# Patient Record
Sex: MALE | Race: WHITE | Employment: UNEMPLOYED | URBAN - METROPOLITAN AREA
[De-identification: names, ages, dates, MRNs, and addresses within clinical notes are randomized per-mention and may not be internally consistent; named-entity substitution may affect disease eponyms.]

---

## 2022-07-04 ENCOUNTER — HOSPITAL ENCOUNTER (OUTPATIENT)
Age: 57
Setting detail: OBSERVATION
Discharge: HOME | End: 2022-07-06
Attending: PSYCHIATRY & NEUROLOGY | Admitting: PSYCHIATRY & NEUROLOGY
Payer: COMMERCIAL

## 2022-07-04 ENCOUNTER — APPOINTMENT (OUTPATIENT)
Dept: GENERAL RADIOLOGY | Age: 57
End: 2022-07-04
Attending: PSYCHIATRY & NEUROLOGY
Payer: COMMERCIAL

## 2022-07-04 PROBLEM — R56.9 SEIZURE (HCC): Status: ACTIVE | Noted: 2022-07-04

## 2022-07-04 LAB
ALBUMIN SERPL-MCNC: 3.8 G/DL (ref 3.5–5.2)
ALBUMIN/GLOBULIN RATIO: 1.2 (ref 1–2.5)
ALP BLD-CCNC: 60 U/L (ref 40–129)
ALT SERPL-CCNC: 67 U/L (ref 5–41)
ANION GAP SERPL CALCULATED.3IONS-SCNC: 12 MMOL/L (ref 9–17)
AST SERPL-CCNC: 60 U/L
BILIRUB SERPL-MCNC: 0.89 MG/DL (ref 0.3–1.2)
BUN BLDV-MCNC: 11 MG/DL (ref 6–20)
CALCIUM SERPL-MCNC: 9 MG/DL (ref 8.6–10.4)
CHLORIDE BLD-SCNC: 104 MMOL/L (ref 98–107)
CO2: 23 MMOL/L (ref 20–31)
CREAT SERPL-MCNC: 0.71 MG/DL (ref 0.7–1.2)
GFR AFRICAN AMERICAN: >60 ML/MIN
GFR NON-AFRICAN AMERICAN: >60 ML/MIN
GFR SERPL CREATININE-BSD FRML MDRD: ABNORMAL ML/MIN/{1.73_M2}
GLUCOSE BLD-MCNC: 99 MG/DL (ref 70–99)
POTASSIUM SERPL-SCNC: 4 MMOL/L (ref 3.7–5.3)
SODIUM BLD-SCNC: 139 MMOL/L (ref 135–144)
TOTAL PROTEIN: 6.9 G/DL (ref 6.4–8.3)

## 2022-07-04 PROCEDURE — 2580000003 HC RX 258

## 2022-07-04 PROCEDURE — G0378 HOSPITAL OBSERVATION PER HR: HCPCS

## 2022-07-04 PROCEDURE — 96372 THER/PROPH/DIAG INJ SC/IM: CPT

## 2022-07-04 PROCEDURE — 6360000002 HC RX W HCPCS

## 2022-07-04 PROCEDURE — 80053 COMPREHEN METABOLIC PANEL: CPT

## 2022-07-04 PROCEDURE — 36415 COLL VENOUS BLD VENIPUNCTURE: CPT

## 2022-07-04 PROCEDURE — G0379 DIRECT REFER HOSPITAL OBSERV: HCPCS

## 2022-07-04 PROCEDURE — 2060000000 HC ICU INTERMEDIATE R&B

## 2022-07-04 PROCEDURE — 6370000000 HC RX 637 (ALT 250 FOR IP)

## 2022-07-04 PROCEDURE — 73030 X-RAY EXAM OF SHOULDER: CPT

## 2022-07-04 RX ORDER — ACETAMINOPHEN 650 MG/1
650 SUPPOSITORY RECTAL EVERY 6 HOURS PRN
Status: DISCONTINUED | OUTPATIENT
Start: 2022-07-04 | End: 2022-07-06 | Stop reason: HOSPADM

## 2022-07-04 RX ORDER — LISINOPRIL 20 MG/1
TABLET ORAL DAILY
COMMUNITY

## 2022-07-04 RX ORDER — LISINOPRIL 20 MG/1
20 TABLET ORAL DAILY
Status: DISCONTINUED | OUTPATIENT
Start: 2022-07-05 | End: 2022-07-06 | Stop reason: HOSPADM

## 2022-07-04 RX ORDER — ENOXAPARIN SODIUM 100 MG/ML
40 INJECTION SUBCUTANEOUS DAILY
Status: DISCONTINUED | OUTPATIENT
Start: 2022-07-04 | End: 2022-07-06 | Stop reason: HOSPADM

## 2022-07-04 RX ORDER — POTASSIUM CHLORIDE 7.45 MG/ML
10 INJECTION INTRAVENOUS PRN
Status: DISCONTINUED | OUTPATIENT
Start: 2022-07-04 | End: 2022-07-06 | Stop reason: HOSPADM

## 2022-07-04 RX ORDER — LORAZEPAM 2 MG/ML
1 INJECTION INTRAMUSCULAR EVERY 5 MIN PRN
Status: DISCONTINUED | OUTPATIENT
Start: 2022-07-04 | End: 2022-07-06 | Stop reason: HOSPADM

## 2022-07-04 RX ORDER — ACETAMINOPHEN 325 MG/1
650 TABLET ORAL EVERY 6 HOURS PRN
Status: DISCONTINUED | OUTPATIENT
Start: 2022-07-04 | End: 2022-07-06 | Stop reason: HOSPADM

## 2022-07-04 RX ORDER — SODIUM CHLORIDE 0.9 % (FLUSH) 0.9 %
5-40 SYRINGE (ML) INJECTION PRN
Status: DISCONTINUED | OUTPATIENT
Start: 2022-07-04 | End: 2022-07-06 | Stop reason: HOSPADM

## 2022-07-04 RX ORDER — POTASSIUM CHLORIDE 20 MEQ/1
40 TABLET, EXTENDED RELEASE ORAL PRN
Status: DISCONTINUED | OUTPATIENT
Start: 2022-07-04 | End: 2022-07-06 | Stop reason: HOSPADM

## 2022-07-04 RX ORDER — OXYCODONE HYDROCHLORIDE AND ACETAMINOPHEN 5; 325 MG/1; MG/1
1 TABLET ORAL EVERY 4 HOURS PRN
Status: DISCONTINUED | OUTPATIENT
Start: 2022-07-04 | End: 2022-07-05

## 2022-07-04 RX ORDER — ONDANSETRON 2 MG/ML
4 INJECTION INTRAMUSCULAR; INTRAVENOUS EVERY 6 HOURS PRN
Status: DISCONTINUED | OUTPATIENT
Start: 2022-07-04 | End: 2022-07-06 | Stop reason: HOSPADM

## 2022-07-04 RX ORDER — LEVETIRACETAM 500 MG/1
500 TABLET ORAL EVERY 12 HOURS
Status: DISCONTINUED | OUTPATIENT
Start: 2022-07-04 | End: 2022-07-06 | Stop reason: HOSPADM

## 2022-07-04 RX ORDER — SODIUM CHLORIDE 9 MG/ML
INJECTION, SOLUTION INTRAVENOUS PRN
Status: DISCONTINUED | OUTPATIENT
Start: 2022-07-04 | End: 2022-07-06 | Stop reason: HOSPADM

## 2022-07-04 RX ORDER — SODIUM CHLORIDE 0.9 % (FLUSH) 0.9 %
5-40 SYRINGE (ML) INJECTION EVERY 12 HOURS SCHEDULED
Status: DISCONTINUED | OUTPATIENT
Start: 2022-07-04 | End: 2022-07-06 | Stop reason: HOSPADM

## 2022-07-04 RX ORDER — ONDANSETRON 4 MG/1
4 TABLET, ORALLY DISINTEGRATING ORAL EVERY 8 HOURS PRN
Status: DISCONTINUED | OUTPATIENT
Start: 2022-07-04 | End: 2022-07-06 | Stop reason: HOSPADM

## 2022-07-04 RX ORDER — POLYETHYLENE GLYCOL 3350 17 G/17G
17 POWDER, FOR SOLUTION ORAL DAILY PRN
Status: DISCONTINUED | OUTPATIENT
Start: 2022-07-04 | End: 2022-07-06 | Stop reason: HOSPADM

## 2022-07-04 RX ADMIN — LEVETIRACETAM 500 MG: 500 TABLET, FILM COATED ORAL at 20:57

## 2022-07-04 RX ADMIN — ENOXAPARIN SODIUM 40 MG: 100 INJECTION SUBCUTANEOUS at 20:57

## 2022-07-04 RX ADMIN — SODIUM CHLORIDE, PRESERVATIVE FREE 10 ML: 5 INJECTION INTRAVENOUS at 20:57

## 2022-07-04 RX ADMIN — OXYCODONE HYDROCHLORIDE AND ACETAMINOPHEN 1 TABLET: 5; 325 TABLET ORAL at 21:22

## 2022-07-04 ASSESSMENT — PAIN DESCRIPTION - LOCATION
LOCATION: SHOULDER
LOCATION_3: BACK
LOCATION_2: ELBOW
LOCATION: BACK;SHOULDER;ELBOW

## 2022-07-04 ASSESSMENT — PAIN DESCRIPTION - ORIENTATION
ORIENTATION: RIGHT;LEFT
ORIENTATION: LEFT
ORIENTATION_2: RIGHT
ORIENTATION_3: LOWER

## 2022-07-04 ASSESSMENT — PAIN DESCRIPTION - ONSET: ONSET: ON-GOING

## 2022-07-04 ASSESSMENT — PAIN DESCRIPTION - FREQUENCY
FREQUENCY: CONTINUOUS
FREQUENCY: CONTINUOUS

## 2022-07-04 ASSESSMENT — PAIN - FUNCTIONAL ASSESSMENT
PAIN_FUNCTIONAL_ASSESSMENT: PREVENTS OR INTERFERES WITH MANY ACTIVE NOT PASSIVE ACTIVITIES
PAIN_FUNCTIONAL_ASSESSMENT_SITE2: PREVENTS OR INTERFERES SOME ACTIVE ACTIVITIES AND ADLS
PAIN_FUNCTIONAL_ASSESSMENT: PREVENTS OR INTERFERES SOME ACTIVE ACTIVITIES AND ADLS

## 2022-07-04 ASSESSMENT — PAIN DESCRIPTION - PAIN TYPE
TYPE: ACUTE PAIN
TYPE: ACUTE PAIN

## 2022-07-04 ASSESSMENT — PAIN SCALES - GENERAL
PAINLEVEL_OUTOF10: 5
PAINLEVEL_OUTOF10: 9
PAINLEVEL_OUTOF10: 8

## 2022-07-04 ASSESSMENT — PAIN DESCRIPTION - DESCRIPTORS
DESCRIPTORS: ACHING;STABBING
DESCRIPTORS_2: ACHING
DESCRIPTORS_3: ACHING;DISCOMFORT

## 2022-07-04 ASSESSMENT — PAIN DESCRIPTION - INTENSITY
RATING_3: 6
RATING_2: 7

## 2022-07-05 ENCOUNTER — APPOINTMENT (OUTPATIENT)
Dept: MRI IMAGING | Age: 57
End: 2022-07-05
Attending: PSYCHIATRY & NEUROLOGY
Payer: COMMERCIAL

## 2022-07-05 PROBLEM — G40.909 SEIZURE DISORDER (HCC): Status: ACTIVE | Noted: 2022-07-05

## 2022-07-05 PROBLEM — R56.9 SEIZURES (HCC): Status: ACTIVE | Noted: 2022-07-05

## 2022-07-05 PROBLEM — Z86.73 HISTORY OF CEREBRAL INFARCTION: Status: ACTIVE | Noted: 2022-07-05

## 2022-07-05 LAB
ABSOLUTE EOS #: 0.18 K/UL (ref 0–0.44)
ABSOLUTE IMMATURE GRANULOCYTE: <0.03 K/UL (ref 0–0.3)
ABSOLUTE LYMPH #: 2.08 K/UL (ref 1.1–3.7)
ABSOLUTE MONO #: 0.57 K/UL (ref 0.1–1.2)
ANION GAP SERPL CALCULATED.3IONS-SCNC: 10 MMOL/L (ref 9–17)
BASOPHILS # BLD: 1 % (ref 0–2)
BASOPHILS ABSOLUTE: 0.04 K/UL (ref 0–0.2)
BUN BLDV-MCNC: 13 MG/DL (ref 6–20)
CALCIUM SERPL-MCNC: 8.8 MG/DL (ref 8.6–10.4)
CHLORIDE BLD-SCNC: 102 MMOL/L (ref 98–107)
CO2: 25 MMOL/L (ref 20–31)
CREAT SERPL-MCNC: 0.86 MG/DL (ref 0.7–1.2)
EOSINOPHILS RELATIVE PERCENT: 4 % (ref 1–4)
GFR AFRICAN AMERICAN: >60 ML/MIN
GFR NON-AFRICAN AMERICAN: >60 ML/MIN
GFR SERPL CREATININE-BSD FRML MDRD: NORMAL ML/MIN/{1.73_M2}
GLUCOSE BLD-MCNC: 98 MG/DL (ref 70–99)
HCT VFR BLD CALC: 42.5 % (ref 40.7–50.3)
HEMOGLOBIN: 15.4 G/DL (ref 13–17)
IMMATURE GRANULOCYTES: 0 %
LYMPHOCYTES # BLD: 43 % (ref 24–43)
MCH RBC QN AUTO: 37.5 PG (ref 25.2–33.5)
MCHC RBC AUTO-ENTMCNC: 36.2 G/DL (ref 28.4–34.8)
MCV RBC AUTO: 103.4 FL (ref 82.6–102.9)
MONOCYTES # BLD: 12 % (ref 3–12)
NRBC AUTOMATED: 0.4 PER 100 WBC
PDW BLD-RTO: 11.9 % (ref 11.8–14.4)
PLATELET # BLD: ABNORMAL K/UL (ref 138–453)
PLATELET, FLUORESCENCE: 106 K/UL (ref 138–453)
PLATELET, IMMATURE FRACTION: 5.7 % (ref 1.1–10.3)
POTASSIUM SERPL-SCNC: 4 MMOL/L (ref 3.7–5.3)
RBC # BLD: 4.11 M/UL (ref 4.21–5.77)
RBC # BLD: ABNORMAL 10*6/UL
SEG NEUTROPHILS: 40 % (ref 36–65)
SEGMENTED NEUTROPHILS ABSOLUTE COUNT: 1.91 K/UL (ref 1.5–8.1)
SODIUM BLD-SCNC: 137 MMOL/L (ref 135–144)
WBC # BLD: 4.8 K/UL (ref 3.5–11.3)

## 2022-07-05 PROCEDURE — 70553 MRI BRAIN STEM W/O & W/DYE: CPT

## 2022-07-05 PROCEDURE — 36415 COLL VENOUS BLD VENIPUNCTURE: CPT

## 2022-07-05 PROCEDURE — 97116 GAIT TRAINING THERAPY: CPT

## 2022-07-05 PROCEDURE — A9579 GAD-BASE MR CONTRAST NOS,1ML: HCPCS

## 2022-07-05 PROCEDURE — 97161 PT EVAL LOW COMPLEX 20 MIN: CPT

## 2022-07-05 PROCEDURE — 6370000000 HC RX 637 (ALT 250 FOR IP)

## 2022-07-05 PROCEDURE — 93880 EXTRACRANIAL BILAT STUDY: CPT

## 2022-07-05 PROCEDURE — 85025 COMPLETE CBC W/AUTO DIFF WBC: CPT

## 2022-07-05 PROCEDURE — 6360000002 HC RX W HCPCS: Performed by: STUDENT IN AN ORGANIZED HEALTH CARE EDUCATION/TRAINING PROGRAM

## 2022-07-05 PROCEDURE — 6360000004 HC RX CONTRAST MEDICATION

## 2022-07-05 PROCEDURE — 6370000000 HC RX 637 (ALT 250 FOR IP): Performed by: STUDENT IN AN ORGANIZED HEALTH CARE EDUCATION/TRAINING PROGRAM

## 2022-07-05 PROCEDURE — G0378 HOSPITAL OBSERVATION PER HR: HCPCS

## 2022-07-05 PROCEDURE — 2580000003 HC RX 258

## 2022-07-05 PROCEDURE — 99223 1ST HOSP IP/OBS HIGH 75: CPT | Performed by: PSYCHIATRY & NEUROLOGY

## 2022-07-05 PROCEDURE — 80048 BASIC METABOLIC PNL TOTAL CA: CPT

## 2022-07-05 PROCEDURE — 85055 RETICULATED PLATELET ASSAY: CPT

## 2022-07-05 PROCEDURE — 96372 THER/PROPH/DIAG INJ SC/IM: CPT

## 2022-07-05 PROCEDURE — 6360000002 HC RX W HCPCS

## 2022-07-05 PROCEDURE — 95816 EEG AWAKE AND DROWSY: CPT | Performed by: PSYCHIATRY & NEUROLOGY

## 2022-07-05 PROCEDURE — 96374 THER/PROPH/DIAG INJ IV PUSH: CPT

## 2022-07-05 PROCEDURE — 95816 EEG AWAKE AND DROWSY: CPT

## 2022-07-05 RX ORDER — OXYCODONE HYDROCHLORIDE AND ACETAMINOPHEN 5; 325 MG/1; MG/1
1 TABLET ORAL EVERY 8 HOURS PRN
Status: DISCONTINUED | OUTPATIENT
Start: 2022-07-05 | End: 2022-07-05

## 2022-07-05 RX ORDER — IBUPROFEN 800 MG/1
800 TABLET ORAL EVERY 8 HOURS PRN
Status: DISCONTINUED | OUTPATIENT
Start: 2022-07-05 | End: 2022-07-06 | Stop reason: HOSPADM

## 2022-07-05 RX ORDER — KETOROLAC TROMETHAMINE 30 MG/ML
30 INJECTION, SOLUTION INTRAMUSCULAR; INTRAVENOUS ONCE
Status: COMPLETED | OUTPATIENT
Start: 2022-07-05 | End: 2022-07-05

## 2022-07-05 RX ORDER — ASPIRIN 81 MG/1
81 TABLET, CHEWABLE ORAL DAILY
Status: DISCONTINUED | OUTPATIENT
Start: 2022-07-05 | End: 2022-07-06 | Stop reason: HOSPADM

## 2022-07-05 RX ORDER — SODIUM CHLORIDE 0.9 % (FLUSH) 0.9 %
5-40 SYRINGE (ML) INJECTION 2 TIMES DAILY
Status: DISCONTINUED | OUTPATIENT
Start: 2022-07-05 | End: 2022-07-06 | Stop reason: HOSPADM

## 2022-07-05 RX ADMIN — IBUPROFEN 800 MG: 800 TABLET, FILM COATED ORAL at 14:02

## 2022-07-05 RX ADMIN — ENOXAPARIN SODIUM 40 MG: 100 INJECTION SUBCUTANEOUS at 08:41

## 2022-07-05 RX ADMIN — LEVETIRACETAM 500 MG: 500 TABLET, FILM COATED ORAL at 21:04

## 2022-07-05 RX ADMIN — IBUPROFEN 800 MG: 800 TABLET, FILM COATED ORAL at 21:04

## 2022-07-05 RX ADMIN — GADOTERIDOL 15 ML: 279.3 INJECTION, SOLUTION INTRAVENOUS at 15:01

## 2022-07-05 RX ADMIN — OXYCODONE HYDROCHLORIDE AND ACETAMINOPHEN 1 TABLET: 5; 325 TABLET ORAL at 05:48

## 2022-07-05 RX ADMIN — SODIUM CHLORIDE, PRESERVATIVE FREE 10 ML: 5 INJECTION INTRAVENOUS at 08:41

## 2022-07-05 RX ADMIN — KETOROLAC TROMETHAMINE 30 MG: 30 INJECTION, SOLUTION INTRAMUSCULAR; INTRAVENOUS at 18:25

## 2022-07-05 RX ADMIN — SODIUM CHLORIDE, PRESERVATIVE FREE 10 ML: 5 INJECTION INTRAVENOUS at 21:04

## 2022-07-05 RX ADMIN — OXYCODONE HYDROCHLORIDE AND ACETAMINOPHEN 1 TABLET: 5; 325 TABLET ORAL at 01:28

## 2022-07-05 RX ADMIN — LISINOPRIL 20 MG: 20 TABLET ORAL at 08:40

## 2022-07-05 RX ADMIN — OXYCODONE HYDROCHLORIDE AND ACETAMINOPHEN 1 TABLET: 5; 325 TABLET ORAL at 09:47

## 2022-07-05 RX ADMIN — LEVETIRACETAM 500 MG: 500 TABLET, FILM COATED ORAL at 08:40

## 2022-07-05 RX ADMIN — ASPIRIN 81 MG: 81 TABLET, CHEWABLE ORAL at 14:01

## 2022-07-05 ASSESSMENT — PAIN DESCRIPTION - LOCATION
LOCATION: SHOULDER
LOCATION: BACK;ELBOW;SHOULDER
LOCATION: SHOULDER
LOCATION_2: ELBOW
LOCATION: SHOULDER
LOCATION: BACK;SHOULDER
LOCATION_3: BACK
LOCATION: SHOULDER

## 2022-07-05 ASSESSMENT — PAIN DESCRIPTION - PAIN TYPE
TYPE: ACUTE PAIN
TYPE: ACUTE PAIN

## 2022-07-05 ASSESSMENT — PAIN SCALES - GENERAL
PAINLEVEL_OUTOF10: 3
PAINLEVEL_OUTOF10: 8
PAINLEVEL_OUTOF10: 7
PAINLEVEL_OUTOF10: 8
PAINLEVEL_OUTOF10: 7
PAINLEVEL_OUTOF10: 3
PAINLEVEL_OUTOF10: 8
PAINLEVEL_OUTOF10: 8
PAINLEVEL_OUTOF10: 5
PAINLEVEL_OUTOF10: 6

## 2022-07-05 ASSESSMENT — PAIN DESCRIPTION - DESCRIPTORS
DESCRIPTORS: ACHING
DESCRIPTORS_3: ACHING;DISCOMFORT
DESCRIPTORS: ACHING;DISCOMFORT
DESCRIPTORS: ACHING;SHARP
DESCRIPTORS: ACHING
DESCRIPTORS: ACHING;STABBING
DESCRIPTORS: ACHING;SHARP;SHOOTING

## 2022-07-05 ASSESSMENT — ENCOUNTER SYMPTOMS
CHEST TIGHTNESS: 0
SHORTNESS OF BREATH: 0
COLOR CHANGE: 0
NAUSEA: 0
PHOTOPHOBIA: 0
ABDOMINAL PAIN: 0
WHEEZING: 0
TROUBLE SWALLOWING: 0
VOICE CHANGE: 0

## 2022-07-05 ASSESSMENT — PAIN - FUNCTIONAL ASSESSMENT
PAIN_FUNCTIONAL_ASSESSMENT: PREVENTS OR INTERFERES WITH MANY ACTIVE NOT PASSIVE ACTIVITIES
PAIN_FUNCTIONAL_ASSESSMENT: PREVENTS OR INTERFERES SOME ACTIVE ACTIVITIES AND ADLS
PAIN_FUNCTIONAL_ASSESSMENT: ACTIVITIES ARE NOT PREVENTED
PAIN_FUNCTIONAL_ASSESSMENT: PREVENTS OR INTERFERES SOME ACTIVE ACTIVITIES AND ADLS
PAIN_FUNCTIONAL_ASSESSMENT: ACTIVITIES ARE NOT PREVENTED
PAIN_FUNCTIONAL_ASSESSMENT: PREVENTS OR INTERFERES SOME ACTIVE ACTIVITIES AND ADLS
PAIN_FUNCTIONAL_ASSESSMENT: ACTIVITIES ARE NOT PREVENTED

## 2022-07-05 ASSESSMENT — PAIN DESCRIPTION - ORIENTATION
ORIENTATION: LEFT
ORIENTATION: LEFT
ORIENTATION: RIGHT;LEFT;LOWER
ORIENTATION_2: RIGHT
ORIENTATION: LEFT
ORIENTATION_3: LOWER
ORIENTATION: RIGHT;LEFT;LOWER

## 2022-07-05 ASSESSMENT — PAIN DESCRIPTION - INTENSITY
RATING_3: 7
RATING_2: 5

## 2022-07-05 ASSESSMENT — PAIN DESCRIPTION - FREQUENCY
FREQUENCY: CONTINUOUS

## 2022-07-05 ASSESSMENT — PAIN DESCRIPTION - ONSET: ONSET: ON-GOING

## 2022-07-05 NOTE — PROCEDURES
[address]                  [facility]                          ELECTROENCEPHALOGRAM REPORT    PATIENT NAME: Jam Silver                 :        1965  MED REC NO:   [id number]                             ROOM:       4778  ACCOUNT NO:   [de-identified]                           ADMIT DATE: 2022  PROVIDER:     Margi Freed MD    DATE OF EE2022    ATTENDING OF RECORD:  Marig Freed MD    REASON FOR STUDY:  This is a 79-year-old gentleman with episode of  unresponsiveness and possible seizure. MEDICATIONS:  Include Advil, Keppra, and lisinopril. EEG FINDINGS:  This is an 18-channel EEG and one EKG channel recording  performed on a patient described to be awake and drowsy. The patient  shows normal waking rhythms. Background activity consists of  well-regulated 10 Hz activity in the 40-60 microvolt range, more  prominent in the posterior head area, showing good reactivity to eye  opening and closing. Over the anterior head regions, there is 15-20 Hz  activity in the 20-30 microvolt range. With drowsiness, there is  further intrusion of slower frequencies in a theta band. This record is  not lateralized or epileptiform. Hyperventilation is not performed. Photic stimulation shows no change of the record. IMPRESSION:  This EEG is within normal limits for an awake and drowsy  patient. No lateralized or epileptiform disturbance is seen.         Joaquin Maldonado MD    D: 2022 12:50:36       T: 2022 12:53:05     [id number]  Job#: [id number]     Doc#: [id number]    CC:

## 2022-07-05 NOTE — H&P
[facility] Neurology   [address] [place] [address]    HISTORY AND PHYSICAL EXAMINATION            Date:   7/4/2022  Patient name:  Sohan Archer  Date of admission:  7/4/2022  6:07 PM  MRN:   [id number]  Account:  [de-identified]  YOB: 1965  PCP:    . None (Inactive)  Room:   [address] [place] [address]  Code Status:    Full Code    Chief Complaint:     Seizure     History Obtained From:     Patient    History of Present Illness: The patient is a 64 y.o. Non- / non  male with past medical history of previous CVA and seizures (not on antiepileptics) who presents to [facility] for the management of likely seizure. Patient started behaving strangely around noon on 7/4/2022. Patient arrived at [facility] ED around 1 PM, and patient was alert and oriented on arrival.  At that time NIH assessment could not be done. Patient received Keppra at previous ED. When the patient was seen and examined at bedside, he stated he experienced left shoulder pain. He admitted to experiencing a fall and losing consciousness, however overall patient is a poor historian. Patient denies experiencing visual disturbances, headache, fevers, chills, chest pain, shortness of breath, abdominal pain. Past Medical History:     Past Medical History:   Diagnosis Date    Arthritis     Chronic back pain         Past Surgical History:     Past Surgical History:   Procedure Laterality Date    FINGER SURGERY          Medications Prior to Admission:     Prior to Admission medications    Medication Sig Start Date End Date Taking? Authorizing Provider   lisinopril (PRINIVIL;ZESTRIL) 20 MG tablet Take by mouth daily   Yes Historical Provider, MD   NONFORMULARY Take by mouth Daily Indications: Sofosbubir velpatavir 400mg/100mg    Yes Historical Provider, MD   oxycodone-acetaminophen (PERCOCET) 5-325 MG per tablet Take 1 tablet by mouth every 4 hours as needed.     Patient not taking: Reported on 7/4/2022    Historical Provider, MD   metoprolol (LOPRESSOR) 50 MG tablet Take 50 mg by mouth 2 times daily. Patient not taking: Reported on 7/4/2022    Historical Provider, MD   tramadol (ULTRAM) 50 MG tablet Take 1 tablet by mouth every 6 hours as needed for Pain. Patient not taking: Reported on 7/4/2022 1/19/12   Esperanza Castillo DO        Allergies:     Patient has no known allergies. Social History:     Tobacco:    reports that he has been smoking. He has never used smokeless tobacco.  Alcohol:      has no history on file for alcohol use. Drug Use:  has no history on file for drug use. Family History:     Family History   Problem Relation Age of Onset    Arthritis Mother     High Blood Pressure Mother        Review of Systems:     Review of Systems   Constitutional: Negative for chills, diaphoresis, fatigue and fever. HENT: Negative for trouble swallowing and voice change. Eyes: Negative for photophobia and visual disturbance. Respiratory: Negative for chest tightness, shortness of breath and wheezing. Cardiovascular: Negative for chest pain, palpitations and leg swelling. Gastrointestinal: Negative for abdominal pain and nausea. Genitourinary: Negative for dysuria and hematuria. Musculoskeletal: Negative for arthralgias and myalgias. Skin: Negative for color change, pallor, rash and wound. Neurological: Positive for weakness (Patient states he has weakness of his left upper extremity, however this is difficult to ascertain if it is due to trauma of the left shoulder. Patient states he has 8 out of 10 pain upon palpation of the left clavicle and shoulder joint). Negative for seizures (Patient states he likely had a seizure.), speech difficulty and headaches. Patient did state he lost consciousness. Psychiatric/Behavioral: Negative for agitation, behavioral problems and confusion.          Physical Exam:   BP (!) 157/91   Pulse 66   Temp 98.2 °F (36.8 °C) (Oral)   Resp 18   Ht 5' 3\" (1.6 m)   Wt 173 lb 1 oz (78.5 kg)   SpO2 96%   BMI 30.66 kg/m²   Temp (24hrs), Av.2 °F (36.8 °C), Min:98.2 °F (36.8 °C), Max:98.2 °F (36.8 °C)    No results for input(s): POCGLU in the last 72 hours.   No intake or output data in the 24 hours ending 22          GENERAL  Appears comfortable and in no distress   HEENT  NC/ AT   HEART  S1 and S2 heard; palpation of pulses: radial pulse    NECK  Supple and no bruits heard   MENTAL STATUS:  Alert, oriented, intact memory, no confusion, normal speech, normal language, no hallucination or delusion   CRANIAL NERVES: II     -      Visual fields intact to confrontation  III,IV,VI -  PERR, EOMs full, no ptosis  V     -     Normal facial sensation   VII    -     Normal facial symmetry  VIII   -     Intact hearing   IX,X -     Symmetrical palate  XI    -     Symmetrical shoulder shrug  XII   -     Midline tongue, no atrophy    MOTOR FUNCTION: RUE: Significant for good strength of grade 5/5 in proximal and distal muscle groups   LUE: Significant for good strength of grade 5/5 in proximal and distal muscle groups   RLE: Significant for good strength of grade 5/5 in proximal and distal muscle groups   LLE: Significant for good strength of grade 5/5 in proximal and distal muscle groups      Normal bulk, normal tone and no involuntary movements, no tremor   SENSORY FUNCTION:  Patient has diminished sensation to crude touch and pinprick on the right side of the body compared to the left including bilateral upper and lower extremities, normal vibration   CEREBELLAR FUNCTION:  Intact fine motor control over upper limbs and lower limbs   REFLEX FUNCTION:  Symmetric in upper and lower extremities, no Babinski sign   STATION and GAIT  deferred         Investigations:      Laboratory Testing:  Recent Results (from the past 24 hour(s))   Comprehensive Metabolic Panel w/ Reflex to MG    Collection Time: 22  8:23 PM   Result Value Ref Range Glucose 99 70 - 99 mg/dL    BUN 11 6 - 20 mg/dL    CREATININE 0.71 0.70 - 1.20 mg/dL    Calcium 9.0 8.6 - 10.4 mg/dL    Sodium 139 135 - 144 mmol/L    Potassium 4.0 3.7 - 5.3 mmol/L    Chloride 104 98 - 107 mmol/L    CO2 23 20 - 31 mmol/L    Anion Gap 12 9 - 17 mmol/L    Alkaline Phosphatase 60 40 - 129 U/L    ALT 67 (H) 5 - 41 U/L    AST 60 (H) <40 U/L    Total Bilirubin 0.89 0.3 - 1.2 mg/dL    Total Protein 6.9 6.4 - 8.3 g/dL    Albumin 3.8 3.5 - 5.2 g/dL    Albumin/Globulin Ratio 1.2 1.0 - 2.5    GFR Non-African American >60 >60 mL/min    GFR African American >60 >60 mL/min    GFR Comment               Assessment :      Primary Problem  Seizure [facility])    Active Hospital Problems    Diagnosis Date Noted    Seizure [facility]) [R56.9] 07/04/2022     Priority: Medium     1. Seizure with loss of consciousness    Patient is a 64 y.o. Non- / non  male presents today for likely seizure event. Patient states he lost consciousness, and had some prodromal symptoms of feeling unwell. Patient was brought in as a transfer from [facility] for management of seizures. Patient is back at baseline mentation, and is able to converse appropriately with logical thought process when he was seen and examined at bedside. Patient has diminished sensation to crude touch and pinprick on right side of the body compared to the left including both upper and lower extremities. He also had left upper extremity weakness, however this is confounded by possible trauma to the left shoulder. X-ray of the left shoulder will be obtained to further evaluate for acute pathology. Patient has intact strength (5 out of 5) on bilateral lower extremities, and has normal DTRs. Patient has grossly intact cranial nerves, II-XII. Patient will be started on Keppra 500 mg p.o. twice daily. There is no evidence of lateral tongue biting.       2.Possible traumatic injury to left shoulder in the setting of fall and complex seizure  Obtain x-ray of left shoulder to evaluate further. 3. Hypertension  Resume lisinopril for 20 mg p.o. daily. 4.DVT prophylaxis  Enoxaparin 40 mg subcutaneous daily    Plan:     Patient status Admit as observation in the  Progressive Unit/Step down    1. Start patient on Keppra 500 mg p.o. twice daily. Obtain MRI of brain with and without IV contrast      2.  obtain x-ray of left shoulder, analgesia with Percocet every 4 hours as needed    Consultations:   None      Follow-up further recommendations after discussing the case with attending  The plan was discussed with the patient, patient's family and the medical staff. Patient is admitted as inpatient status because of co-morbidities listed above, severity of signs and symptoms as outlined, requirement for current medical therapies and most importantly because of direct risk to patient if care not provided in a hospital setting.     Adi Lenz DO  7/4/2022  10:34 PM

## 2022-07-05 NOTE — PLAN OF CARE
Problem: Pain  Goal: Verbalizes/displays adequate comfort level or baseline comfort level  7/5/2022 1855 by Luis Lyles RN  Outcome: Progressing  7/5/2022 0510 by Elisabet Riggs RN  Outcome: Progressing

## 2022-07-05 NOTE — PROGRESS NOTES
[facility]up[facility] [address]  Occupational Therapy Not Seen Note    DATE: 2022    NAME: Heidi Crockett  MRN: [id number]   : 1965      Patient not seen this date for Occupational Therapy due to:      Testing: Pt off the floor at MRI at this time      Next Scheduled Treatment: Will check back 2022 as able     Electronically signed by Danette Miller OT on 2022 at 2:53 PM

## 2022-07-05 NOTE — PROGRESS NOTES
Physical Therapy  Facility/Department: [facility]  Physical Therapy Initial Assessment    Name: Fior Selby  : 1965  MRN: [id number]  Date of Service: 2022  The patient is a 64 y.o. Non- / non  male with past medical history of previous CVA and seizures (not on antiepileptics) who presents to [address] [place] [address] for the management of likely seizure. Patient started behaving strangely around noon on 2022. Patient arrived at [facility] ED around 1 PM, and patient was alert and oriented on arrival.  At that time NIH assessment could not be done. Patient received Keppra at previous ED. When the patient was seen and examined at bedside, he stated he experienced left shoulder pain. He admitted to experiencing a fall and losing consciousness, however overall patient is a poor historian. Patient denies experiencing visual disturbances, headache, fevers, chills, chest pain, shortness of breath, abdominal pain.      Discharge Recommendations:  Patient would benefit from continued therapy after discharge   PT Equipment Recommendations  Equipment Needed: No  Other: Recommend he uses either his cane or walker after discharge      Patient Diagnosis(es): There were no encounter diagnoses. Past Medical History:  has a past medical history of Arthritis and Chronic back pain. Past Surgical History:  has a past surgical history that includes Finger surgery. Assessment   Body Structures, Functions, Activity Limitations Requiring Skilled Therapeutic Intervention: Decreased functional mobility ; Decreased balance; Increased pain  Assessment: Patient is able to ambulate without device. In a community setting, or on uneven surfaces, his fall risk during ambulation without a device is increased. Recommend cane or walker use after discharge until he is back to his baseline balance ability. Patient verbalized agreement with this plan.  Patient will need further PT to regain Judgement: Good awareness of safety precautions  Problem Solving: Assistance required to generate solutions;Assistance required to identify errors made  Insights: Fully aware of deficits  Initiation: Does not require cues  Sequencing: Does not require cues     Objective   Heart Rate: 60  SpO2: 99 %  O2 Device: None (Room air)     Observation/Palpation  Observation: Previous surgery to right ankle, not fused in joint. Ambulates as if he is much older than 56 years. Strength RLE  Strength RLE: Exception  R Hip Flexion: 4+/5  R Knee Extension: 4+/5  R Ankle Dorsiflexion: 4+/5  R Ankle Plantar flexion: 4+/5  Strength LLE  Strength LLE: Exception  L Hip Flexion: 4+/5  L Knee Extension: 4+/5  L Ankle Dorsiflexion: 4+/5  L Ankle Plantar Flexion: 4+/5           Bed mobility  Rolling to Right: Supervision  Supine to Sit: Supervision  Sit to Supine: Supervision  Bed Mobility Comments: Held his breath during supine to sit then complained of feeling \"dizzy\" upon sitting up. Resolved while sitting momentarily. Transfers  Sit to Stand: Stand by assistance  Stand to sit: Stand by assistance  Ambulation  Surface: level tile  Device: No Device  Assistance: Stand by assistance  Quality of Gait: Short steps lengths with swing foot being placed just beyond the level of the stance foot. As gait continues, he does not \"loosen up\" and increase his step lengths.   Decreased eccentric control of dorsiflexors bilaterally  Gait Deviations: Slow Diana  Distance: 200'     Balance  Sitting - Static: Good  Sitting - Dynamic: Good  Standing - Static: Good;-  Standing - Dynamic: Good;-  Romberg/Narrow Base of Support  Eyes Open: 30 seconds  Sway: Minimal  Eyes Closed: 10 seconds  Sway: Moderate     AM-PAC Score  AM-PAC Inpatient Mobility Raw Score : 21 (07/05/22 0931)  AM-PAC Inpatient T-Scale Score : 50.25 (07/05/22 0931)  Mobility Inpatient CMS 0-100% Score: 28.97 (07/05/22 0931)  Mobility Inpatient CMS G-Code Modifier : CJ (07/05/22 6219)          Goals  Short Term Goals  Time Frame for Short term goals: 14 visits  Short term goal 1: Sit to/from stand with independence. Short term goal 2: Ambulate 200' with no device with independence. Short term goal 3: Patient will be independent with HEP. Short term goal 4: Negotiate up and down 3 steps with one railing with SBA. Education  Patient Education  Education Given To: Patient  Education Provided: Role of Therapy;Plan of Care; Fall Prevention Strategies;Precautions  Education Method: Demonstration;Verbal  Barriers to Learning: None  Education Outcome: Verbalized understanding      Therapy Time   Individual Concurrent Group Co-treatment   Time In 0915         Time Out 0940         Minutes 25         Timed Code Treatment Minutes: [address], PT

## 2022-07-06 VITALS
SYSTOLIC BLOOD PRESSURE: 130 MMHG | WEIGHT: 173.06 LBS | HEIGHT: 63 IN | OXYGEN SATURATION: 97 % | DIASTOLIC BLOOD PRESSURE: 76 MMHG | BODY MASS INDEX: 30.66 KG/M2 | HEART RATE: 86 BPM | RESPIRATION RATE: 18 BRPM | TEMPERATURE: 98 F

## 2022-07-06 LAB
ABSOLUTE EOS #: 0.16 K/UL (ref 0–0.44)
ABSOLUTE IMMATURE GRANULOCYTE: <0.03 K/UL (ref 0–0.3)
ABSOLUTE LYMPH #: 1.68 K/UL (ref 1.1–3.7)
ABSOLUTE MONO #: 0.46 K/UL (ref 0.1–1.2)
ANION GAP SERPL CALCULATED.3IONS-SCNC: 11 MMOL/L (ref 9–17)
BASOPHILS # BLD: 1 % (ref 0–2)
BASOPHILS ABSOLUTE: <0.03 K/UL (ref 0–0.2)
BUN BLDV-MCNC: 18 MG/DL (ref 6–20)
CALCIUM SERPL-MCNC: 9.3 MG/DL (ref 8.6–10.4)
CHLORIDE BLD-SCNC: 102 MMOL/L (ref 98–107)
CO2: 24 MMOL/L (ref 20–31)
CREAT SERPL-MCNC: 0.94 MG/DL (ref 0.7–1.2)
EOSINOPHILS RELATIVE PERCENT: 4 % (ref 1–4)
GFR AFRICAN AMERICAN: >60 ML/MIN
GFR NON-AFRICAN AMERICAN: >60 ML/MIN
GFR SERPL CREATININE-BSD FRML MDRD: NORMAL ML/MIN/{1.73_M2}
GLUCOSE BLD-MCNC: 88 MG/DL (ref 70–99)
HCT VFR BLD CALC: 45.9 % (ref 40.7–50.3)
HEMOGLOBIN: 16 G/DL (ref 13–17)
IMMATURE GRANULOCYTES: 0 %
LYMPHOCYTES # BLD: 38 % (ref 24–43)
MCH RBC QN AUTO: 35.9 PG (ref 25.2–33.5)
MCHC RBC AUTO-ENTMCNC: 34.9 G/DL (ref 28.4–34.8)
MCV RBC AUTO: 102.9 FL (ref 82.6–102.9)
MONOCYTES # BLD: 11 % (ref 3–12)
NRBC AUTOMATED: 0 PER 100 WBC
PDW BLD-RTO: 11.8 % (ref 11.8–14.4)
PLATELET # BLD: 85 K/UL (ref 138–453)
PMV BLD AUTO: 12.3 FL (ref 8.1–13.5)
POTASSIUM SERPL-SCNC: 4.2 MMOL/L (ref 3.7–5.3)
RBC # BLD: 4.46 M/UL (ref 4.21–5.77)
SEG NEUTROPHILS: 46 % (ref 36–65)
SEGMENTED NEUTROPHILS ABSOLUTE COUNT: 2.07 K/UL (ref 1.5–8.1)
SODIUM BLD-SCNC: 137 MMOL/L (ref 135–144)
WBC # BLD: 4.4 K/UL (ref 3.5–11.3)

## 2022-07-06 PROCEDURE — 2580000003 HC RX 258

## 2022-07-06 PROCEDURE — 6360000002 HC RX W HCPCS

## 2022-07-06 PROCEDURE — 6370000000 HC RX 637 (ALT 250 FOR IP): Performed by: STUDENT IN AN ORGANIZED HEALTH CARE EDUCATION/TRAINING PROGRAM

## 2022-07-06 PROCEDURE — 96372 THER/PROPH/DIAG INJ SC/IM: CPT

## 2022-07-06 PROCEDURE — 36415 COLL VENOUS BLD VENIPUNCTURE: CPT

## 2022-07-06 PROCEDURE — 80048 BASIC METABOLIC PNL TOTAL CA: CPT

## 2022-07-06 PROCEDURE — 6370000000 HC RX 637 (ALT 250 FOR IP)

## 2022-07-06 PROCEDURE — 99232 SBSQ HOSP IP/OBS MODERATE 35: CPT | Performed by: PSYCHIATRY & NEUROLOGY

## 2022-07-06 PROCEDURE — 85025 COMPLETE CBC W/AUTO DIFF WBC: CPT

## 2022-07-06 PROCEDURE — G0378 HOSPITAL OBSERVATION PER HR: HCPCS

## 2022-07-06 RX ORDER — ASPIRIN 81 MG/1
81 TABLET, CHEWABLE ORAL DAILY
Qty: 30 TABLET | Refills: 3 | Status: SHIPPED | OUTPATIENT
Start: 2022-07-07

## 2022-07-06 RX ORDER — LEVETIRACETAM 500 MG/1
500 TABLET ORAL EVERY 12 HOURS
Qty: 60 TABLET | Refills: 3 | Status: SHIPPED | OUTPATIENT
Start: 2022-07-06

## 2022-07-06 RX ORDER — CALCIUM CARBONATE 200(500)MG
500 TABLET,CHEWABLE ORAL 3 TIMES DAILY PRN
Status: DISCONTINUED | OUTPATIENT
Start: 2022-07-06 | End: 2022-07-06 | Stop reason: HOSPADM

## 2022-07-06 RX ADMIN — IBUPROFEN 800 MG: 800 TABLET, FILM COATED ORAL at 08:17

## 2022-07-06 RX ADMIN — ANTACID TABLETS 500 MG: 500 TABLET, CHEWABLE ORAL at 01:27

## 2022-07-06 RX ADMIN — ASPIRIN 81 MG: 81 TABLET, CHEWABLE ORAL at 08:18

## 2022-07-06 RX ADMIN — LEVETIRACETAM 500 MG: 500 TABLET, FILM COATED ORAL at 08:18

## 2022-07-06 RX ADMIN — ENOXAPARIN SODIUM 40 MG: 100 INJECTION SUBCUTANEOUS at 08:19

## 2022-07-06 RX ADMIN — SODIUM CHLORIDE, PRESERVATIVE FREE 10 ML: 5 INJECTION INTRAVENOUS at 08:19

## 2022-07-06 RX ADMIN — LISINOPRIL 20 MG: 20 TABLET ORAL at 08:18

## 2022-07-06 ASSESSMENT — ENCOUNTER SYMPTOMS
ABDOMINAL DISTENTION: 0
NAUSEA: 0
CHEST TIGHTNESS: 0
CONSTIPATION: 0
SHORTNESS OF BREATH: 0
VOMITING: 0
WHEEZING: 0
DIARRHEA: 0
COUGH: 0
APNEA: 0
ABDOMINAL PAIN: 0

## 2022-07-06 ASSESSMENT — PAIN DESCRIPTION - FREQUENCY
FREQUENCY: CONTINUOUS
FREQUENCY: CONTINUOUS

## 2022-07-06 ASSESSMENT — PAIN - FUNCTIONAL ASSESSMENT
PAIN_FUNCTIONAL_ASSESSMENT: ACTIVITIES ARE NOT PREVENTED

## 2022-07-06 ASSESSMENT — PAIN DESCRIPTION - ORIENTATION
ORIENTATION: LEFT

## 2022-07-06 ASSESSMENT — PAIN SCALES - GENERAL
PAINLEVEL_OUTOF10: 9
PAINLEVEL_OUTOF10: 5
PAINLEVEL_OUTOF10: 9

## 2022-07-06 ASSESSMENT — PAIN DESCRIPTION - LOCATION
LOCATION: SHOULDER

## 2022-07-06 ASSESSMENT — PAIN DESCRIPTION - DESCRIPTORS
DESCRIPTORS: ACHING
DESCRIPTORS: DULL;ACHING
DESCRIPTORS: DULL;ACHING

## 2022-07-06 NOTE — DISCHARGE SUMMARY
[facility]     Department of Neurology    INPATIENT DISCHARGE SUMMARY        Patient Identification:  Moni Howell is a 64 y.o. male. :  1965  MRN: [id number]     Acct: [de-identified]   Admit Date:  2022  Discharge date and time: 2022  Attending Provider: Martha Shea, *                                     Admission Diagnoses:   Seizure ([facility] [address] [R56.9]  Seizures ([facility] [address] [R56.9]    Discharge Diagnoses:   Principal Problem:    Seizure [facility])  Active Problems:    Seizure disorder ([facility] [address]    History of cerebral infarction  Resolved Problems:    * No resolved hospital problems. *       Consults:   none    Brief Inpatient course:    Patient is a 59-year-old male with prior history of CVA and seizures (not on any antiepileptics), presented to [facility] ED as a transfer from [facility] due to seizure-like event at home. Patient had similar events previously but had not been worked up or diagnosed with seizure disorder. Patient was loaded with Keppra prior to transfer, was continued on Keppra 500 mg twice daily. 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 Patient also underwent carotid ultrasound to rule out any significant stenosis, was unremarkable. Patient was counseled regarding his risk of seizures and the need for antiepileptic medication, was continued on Keppra 500 mg twice daily. Patient felt he was back to baseline without any significant deficits. Was discharged home with outpatient follow-up. Patient is stable from neurological standpoint to be discharged.     Procedures: None    Any Hospital Acquired Infections: none    Discharge Functional Status:  stable    Disposition: home    Patient Instructions:   Compliance with medication  Outpatient follow-up with neurology and PCPactivity as tolerated  No driving for at least 6 months. No heavy lifting for at least 6 months  No bathing or swimming unsupervised  Avoid locking doors, it prevents someone from helping you if needed  Avoid stairs unsupervised  Avoid cooking unsupervised      Discharge Medications:  Current Discharge Medication List      START taking these medications    Details   levETIRAcetam (KEPPRA) 500 MG tablet Take 1 tablet by mouth every 12 hours  Qty: 60 tablet, Refills: 3      aspirin 81 MG chewable tablet Take 1 tablet by mouth daily  Qty: 30 tablet, Refills: 3         CONTINUE these medications which have NOT CHANGED    Details   lisinopril (PRINIVIL;ZESTRIL) 20 MG tablet Take by mouth daily      NONFORMULARY Take by mouth Daily Indications: Sofosbubir velpatavir 400mg/100mg       oxycodone-acetaminophen (PERCOCET) 5-325 MG per tablet Take 1 tablet by mouth every 4 hours as needed. metoprolol (LOPRESSOR) 50 MG tablet Take 50 mg by mouth 2 times daily. tramadol (ULTRAM) 50 MG tablet Take 1 tablet by mouth every 6 hours as needed for Pain.   Qty: 40 tablet, Refills: 0    Associated Diagnoses: Headache(784.0)             Activity: activity as tolerated    Restrictions: Driving Yes and No, Swimming Yes, Operating heavy machinery Yes, Compromising heights Yes    Diet: regular diet    Follow-up:    [address],  [address]  [facility] [address] [place] [address], [address] [place] [address]  In 6 months  Hospital follow-up      Follow up labs: None    Follow up imaging: None    Note that over 30 minutes was spent in preparing discharge papers, discussing discharge with patient, medication review, etc.      Desirae Monaco MD,  Department of Neurology  [address] [place] [address]  7/6/2022, 4:54 PM

## 2022-07-06 NOTE — PROGRESS NOTES
CLINICAL PHARMACY NOTE: MEDS TO BEDS    Total # of Prescriptions Filled: 2   The following medications were delivered to the patient:  · keppra  · aspirin    Additional Documentation:

## 2022-07-06 NOTE — CARE COORDINATION
07/06/22 0840   Service Assessment   Patient Orientation Alert and Oriented   Cognition Alert   History Provided By Patient   Primary Caregiver Self  (girlfriend helps)   [address]or [address] is: Legal Next of Paul 69   PCP Verified by CM Yes  (Dr. Teo Floyd in [facility].)   Last Visit to PCP Within last 3 months   Prior Functional Level Independent in ADLs/IADLs   Current Functional Level Independent in ADLs/IADLs   Can patient return to prior living arrangement Yes   Ability to make needs known: Good   Family able to assist with home care needs: Yes   Social/Functional History   Lives With Significant other   Type of [address] Two level;Bed/Bath upstairs   Home Access Stairs to enter with rails   Entrance Stairs - Number of Steps 3   [address] Help From Friend(s)   ADL Assistance Independent   Homemaking Assistance Independent   Ambulation Assistance Independent   Transfer Assistance Independent   Active  No   Mode of Transportation Friends   Occupation Other(comment)  (doesn't work)   Discharge Planning   Living Arrangements Spouse/Significant Other   [address] Medications Yes   Meds-to-Beds: Does the patient want to have any new prescriptions delivered to bedside prior to discharge? Yes   Patient expects to be discharged to: House   One/Two Story Residence Two story   Condition of Participation: Discharge Planning   The Plan for Transition of Care is related to the following treatment goals: To get better and go home   Plan is to return home independent with girlfriend who will provide transport.

## 2022-07-06 NOTE — PROGRESS NOTES
[facility] Neurology   [address] [place] [address]    Progress Note             Date:   7/6/2022  Patient name:  Sruthi Ward  Date of admission:  7/4/2022  6:07 PM  MRN:   [id number]  Account:  [de-identified]  YOB: 1965  PCP:    . None (Inactive)  Room:   [address] [place] [address]  Code Status:    Full Code    Chief Complaint:     Seizure like activity    Interval hx: The patient was seen and examined at bedside. Is vitally stable, alert and oriented x 4. No acute events overnight. The patient stated that he feels well, denied any complaints. States he got out of the bed and took a shower this morning. Eager to be discharged home. Completed carotid Doppler this morning, awaiting report. Brief History of Present Illness: The patient is a 64 y.o. Non- / non  male with past medical history of previous CVA and seizures (not on antiepileptics) who presents to [address] [place] [address] for the management of likely seizure. Patient started behaving strangely around noon on 7/4/2022. Patient arrived at [facility] ED around 1 PM, and patient was alert and oriented on arrival.  At that time NIH assessment could not be done. Patient received Keppra at previous ED. When the patient was seen and examined at bedside, he stated he experienced left shoulder pain. He admitted to experiencing a fall and losing consciousness, however overall patient is a poor historian. Patient denies experiencing visual disturbances, headache, fevers, chills, chest pain, shortness of breath, abdominal pain. Past Medical History:     Past Medical History:   Diagnosis Date    Arthritis     Chronic back pain         Past Surgical History:     Past Surgical History:   Procedure Laterality Date    FINGER SURGERY          Medications Prior to Admission:     Prior to Admission medications    Medication Sig Start Date End Date Taking?  Authorizing Provider   lisinopril (PRINIVIL;ZESTRIL) 20 MG tablet Take by mouth daily   Yes Historical Provider, MD   NONFORMULARY Take by mouth Daily Indications: Sofosbubir velpatavir 400mg/100mg    Yes Historical Provider, MD   oxycodone-acetaminophen (PERCOCET) 5-325 MG per tablet Take 1 tablet by mouth every 4 hours as needed. Patient not taking: Reported on 7/4/2022    Historical Provider, MD   metoprolol (LOPRESSOR) 50 MG tablet Take 50 mg by mouth 2 times daily. Patient not taking: Reported on 7/4/2022    Historical Provider, MD   tramadol (ULTRAM) 50 MG tablet Take 1 tablet by mouth every 6 hours as needed for Pain. Patient not taking: Reported on 7/4/2022 1/19/12   Andres Feldman DO        Allergies:     Patient has no known allergies. Social History:     Tobacco:    reports that he has been smoking. He has never used smokeless tobacco.  Alcohol:      has no history on file for alcohol use. Drug Use:  has no history on file for drug use. Family History:     Family History   Problem Relation Age of Onset    Arthritis Mother     High Blood Pressure Mother        Review of Systems:     Review of Systems   Constitutional: Negative for activity change, appetite change, chills, diaphoresis and fever. Respiratory: Negative for apnea, cough, chest tightness, shortness of breath and wheezing. Cardiovascular: Negative for chest pain and palpitations. Gastrointestinal: Negative for abdominal distention, abdominal pain, constipation, diarrhea, nausea and vomiting. Genitourinary: Negative for difficulty urinating, dysuria and frequency. Musculoskeletal: Negative for arthralgias and myalgias. Neurological: Negative for dizziness, light-headedness and headaches. Psychiatric/Behavioral: Negative for agitation and confusion. All other systems reviewed and are negative.       Physical Exam:   /83   Pulse 51   Temp 98.1 °F (36.7 °C) (Oral)   Resp 16   Ht 5' 3\" (1.6 m)   Wt 173 lb 1 oz (78.5 kg)   SpO2 97% BMI 30.66 kg/m²   Temp (24hrs), Av.8 °F (36.6 °C), Min:97.6 °F (36.4 °C), Max:98.1 °F (36.7 °C)    No results for input(s): POCGLU in the last 72 hours.     Intake/Output Summary (Last 24 hours) at 2022 0915  Last data filed at 2022 1300  Gross per 24 hour   Intake 480 ml   Output --   Net 480 ml         Neurologic Exam     GENERAL  Appears comfortable and in no distress   HEENT  NC/ AT   HEART  S1 and S2 heard; palpation of pulses: radial pulse    NECK  Supple and no bruits heard   MENTAL STATUS:  Alert, oriented, intact memory, no confusion, normal speech, normal language, no hallucination or delusion   CRANIAL NERVES: II     -      Visual fields intact to confrontation  III,IV,VI -  PERR, EOMs full, no ptosis  V     -     Normal facial sensation   VII    -     Normal facial symmetry  VIII   -     Intact hearing   IX,X -     Symmetrical palate  XI    -     Symmetrical shoulder shrug  XII   -     Midline tongue, no atrophy    MOTOR FUNCTION: RUE: Significant for good strength of grade 5/5 in proximal and distal muscle groups   LUE: Significant for good strength of grade 5/5 in proximal and distal muscle groups   RLE: Significant for good strength of grade 5/5 in proximal and distal muscle groups   LLE: Significant for good strength of grade 5/5 in proximal and distal muscle groups      Normal bulk, normal tone and no involuntary movements, no tremor   SENSORY FUNCTION:  Normal touch, normal pinprick, normal vibration, normal proprioception   CEREBELLAR FUNCTION:  Intact fine motor control over upper limbs and lower limbs   REFLEX FUNCTION:  Symmetric in upper and lower extremities, no Babinski sign   STATION and GAIT  Normal gait and tandem station, normal tip toes and heel walking       Investigations:      Laboratory Testing:  Recent Results (from the past 24 hour(s))   Basic Metabolic Panel w/ Reflex to MG    Collection Time: 22  4:24 AM   Result Value Ref Range    Glucose 88 70 - 99 mg/dL BUN 18 6 - 20 mg/dL    CREATININE 0.94 0.70 - 1.20 mg/dL    Calcium 9.3 8.6 - 10.4 mg/dL    Sodium 137 135 - 144 mmol/L    Potassium 4.2 3.7 - 5.3 mmol/L    Chloride 102 98 - 107 mmol/L    CO2 24 20 - 31 mmol/L    Anion Gap 11 9 - 17 mmol/L    GFR Non-African American >60 >60 mL/min    GFR African American >60 >60 mL/min    GFR Comment         CBC with Auto Differential    Collection Time: 07/06/22  4:24 AM   Result Value Ref Range    WBC 4.4 3.5 - 11.3 k/uL    RBC 4.46 4.21 - 5.77 m/uL    Hemoglobin 16.0 13.0 - 17.0 g/dL    Hematocrit 45.9 40.7 - 50.3 %    .9 82.6 - 102.9 fL    MCH 35.9 (H) 25.2 - 33.5 pg    MCHC 34.9 (H) 28.4 - 34.8 g/dL    RDW 11.8 11.8 - 14.4 %    Platelets 85 (L) 853 - 453 k/uL    MPV 12.3 8.1 - 13.5 fL    NRBC Automated 0.0 0.0 per 100 WBC    Seg Neutrophils 46 36 - 65 %    Lymphocytes 38 24 - 43 %    Monocytes 11 3 - 12 %    Eosinophils % 4 1 - 4 %    Basophils 1 0 - 2 %    Immature Granulocytes 0 0 %    Segs Absolute 2.07 1.50 - 8.10 k/uL    Absolute Lymph # 1.68 1.10 - 3.70 k/uL    Absolute Mono # 0.46 0.10 - 1.20 k/uL    Absolute Eos # 0.16 0.00 - 0.44 k/uL    Basophils Absolute <0.03 0.00 - 0.20 k/uL    Absolute Immature Granulocyte <0.03 0.00 - 0.30 k/uL     Recent Labs     07/06/22  0424   WBC 4.4   RBC 4.46   HGB 16.0   HCT 45.9   .9   MCH 35.9*   MCHC 34.9*   RDW 11.8   PLT 85*   MPV 12.3     Recent Labs     07/04/22 2023 07/05/22 0728 07/06/22  0424      < > 137   K 4.0   < > 4.2      < > 102   CO2 23   < > 24   BUN 11   < > 18   CREATININE 0.71   < > 0.94   GLUCOSE 99   < > 88   CALCIUM 9.0   < > 9.3   PROT 6.9  --   --    LABALBU 3.8  --   --    BILITOT 0.89  --   --    ALKPHOS 60  --   --    AST 60*  --   --    ALT 67*  --   --     < > = values in this interval not displayed.      No results found for: LABA1C    Assessment :      Primary Problem  Seizure [facility])    Active Hospital Problems    Diagnosis Date Noted    Seizure disorder ([facility] [H30.519] 07/05/2022     Priority: Medium    History of cerebral infarction [Z86.73] 07/05/2022     Priority: Medium    Seizure [facility]) [R56.9] 07/04/2022     Priority: Medium       Patient is a 64 y.o. male with history of cerebral infarction 2 years ago, presented with episode of unresponsiveness which are suspicious for seizure event. Patient has had similar events in the past, never been worked up or diagnosed with seizure disorder. Was loaded with Keppra at outlying facility, started on maintenance doses of her transfer. EEG unremarkable. MRI brain with and without contrast unremarkable. We will continue Keppra and have patient follow-up outpatient for further monitoring. 1. Seizure-like activity    Plan:     · MRI brain with and without contrast: Unremarkable  · Routine EEG unremarkable  · Carotid Doppler report pending  · Continue Keppra 500 mg twice daily  · Continue aspirin 81 mg daily  · PT/OT  · Discharge home today      Follow-up further recommendations after discussing the case with attending  The plan was discussed with the patient, patient's family and the medical staff. Consultations:   None    Patient is admitted as inpatient status because of co-morbidities listed above, severity of signs and symptoms as outlined, requirement for current medical therapies and most importantly because of direct risk to patient if care not provided in a hospital setting.     Cristin Thomas MD  7/6/2022  9:15 AM    Copy sent to  . Nas (Inactive)

## 2022-07-06 NOTE — PROGRESS NOTES
Pt. received prescriptions from pharmacy. IV removed. Discharge paperwork reviewed. Pt. discharged and left with spouse.

## 2022-07-06 NOTE — PLAN OF CARE
Problem: Pain  Goal: Verbalizes/displays adequate comfort level or baseline comfort level  7/6/2022 1719 by Alton White RN  Outcome: Completed  7/6/2022 0457 by Steven Garcia RN  Outcome: Progressing     Problem: Safety - Adult  Goal: Free from fall injury  7/6/2022 1719 by Alton White RN  Outcome: Completed  7/6/2022 0457 by Steven Garcia RN  Outcome: Progressing     Problem: ABCDS Injury Assessment  Goal: Absence of physical injury  7/6/2022 1719 by Alton White RN  Outcome: Completed  Flowsheets (Taken 7/6/2022 0830 by Porsha Downs RN)  Absence of Physical Injury: Implement safety measures based on patient assessment  7/6/2022 0457 by Steven Garcia RN  Outcome: Progressing

## 2022-07-06 NOTE — PLAN OF CARE
Problem: Pain  Goal: Verbalizes/displays adequate comfort level or baseline comfort level  7/6/2022 0457 by Matteo Berger RN  Outcome: Progressing  7/5/2022 1855 by Lillian Valdez RN  Outcome: Progressing     Problem: Safety - Adult  Goal: Free from fall injury  7/6/2022 0457 by Matteo Berger RN  Outcome: Progressing  7/5/2022 1855 by Lillian Valdez RN  Outcome: Progressing     Problem: ABCDS Injury Assessment  Goal: Absence of physical injury  7/6/2022 0457 by Matteo Berger RN  Outcome: Progressing  7/5/2022 1855 by Lillian Valdez RN  Outcome: Progressing

## 2024-08-22 ENCOUNTER — APPOINTMENT (OUTPATIENT)
Dept: SURGICAL ONCOLOGY | Facility: CLINIC | Age: 59
End: 2024-08-22
Payer: COMMERCIAL

## 2024-08-22 ENCOUNTER — APPOINTMENT (OUTPATIENT)
Dept: SURGERY | Facility: CLINIC | Age: 59
End: 2024-08-22
Payer: COMMERCIAL

## 2024-08-22 VITALS
OXYGEN SATURATION: 95 % | WEIGHT: 186 LBS | HEART RATE: 74 BPM | BODY MASS INDEX: 32.96 KG/M2 | DIASTOLIC BLOOD PRESSURE: 109 MMHG | SYSTOLIC BLOOD PRESSURE: 216 MMHG | HEIGHT: 63 IN | TEMPERATURE: 97.2 F

## 2024-08-22 DIAGNOSIS — C22.0 HEPATOCELLULAR CARCINOMA (MULTI): Primary | ICD-10-CM

## 2024-08-22 PROCEDURE — 3008F BODY MASS INDEX DOCD: CPT | Performed by: SURGERY

## 2024-08-22 PROCEDURE — 99205 OFFICE O/P NEW HI 60 MIN: CPT | Performed by: SURGERY

## 2024-08-22 PROCEDURE — 1036F TOBACCO NON-USER: CPT | Performed by: SURGERY

## 2024-08-22 RX ORDER — METOPROLOL TARTRATE 50 MG/1
50 TABLET ORAL 2 TIMES DAILY
COMMUNITY

## 2024-08-22 ASSESSMENT — PAIN SCALES - GENERAL: PAINLEVEL: 0-NO PAIN

## 2024-08-22 ASSESSMENT — PATIENT HEALTH QUESTIONNAIRE - PHQ9
SUM OF ALL RESPONSES TO PHQ9 QUESTIONS 1 & 2: 0
2. FEELING DOWN, DEPRESSED OR HOPELESS: NOT AT ALL
1. LITTLE INTEREST OR PLEASURE IN DOING THINGS: NOT AT ALL

## 2024-08-22 ASSESSMENT — ENCOUNTER SYMPTOMS
OCCASIONAL FEELINGS OF UNSTEADINESS: 0
LOSS OF SENSATION IN FEET: 0
DEPRESSION: 0

## 2024-08-23 DIAGNOSIS — B18.2 CHRONIC HEPATITIS C WITHOUT HEPATIC COMA (MULTI): ICD-10-CM

## 2024-08-23 DIAGNOSIS — C22.0 HEPATOCELLULAR CARCINOMA (MULTI): Primary | ICD-10-CM

## 2024-08-23 NOTE — PROGRESS NOTES
Chief Complaint: Patient presents for evaluation of HCC     History of Present Illness:  Robert Villalta  is a 58 y.o. with hx HCV and past heavy EtOH usage. Liver US demonstrated a mass and MRI confirmed HCC 4 cm in Segment VII. AFP also elevated at 505. Referred today for potential surgery vs. alternative treatment options.    Past hx of HCV, never treated and now has HCV antibody+ but HCV RNA negative suggesting cleared virus without treatment. Past hx heavy EtOH abuse: case beer and 1/2 of fifth of liquor daily for >20 years. Now drinks few times week 4-6 beers each time. Past heavy tobacco use; quit 6 yrs ago. Pack year history >50 years. Liver imaging c/w with hepatic fibrosis vs. cirrhosis.    MELD 3.0 calculated to 11 from labs  except INR (assuming normal)  Pertinent labs : Na 138, Cr 0.78, Bilirubin 0.6, AST 40, ALT 55, Platelet 123K, Albumin 1.3,   HCV antibody + and HCV RNA quant undetectable     Ascites: none  Encephalopathy: none  Varices: none  Jaundice: none  DVT/PE: none  HCC/Malignancy: per HPI  Portal Vein Thrombosis: none     Past Medical History:  HTN  CVA: few years ago, no residual deficits  Nephrolithiasis: passed stone w/o intervention     Past Surgical History:  Right hand traumatic digit amputations  Hardware in RLE after fracture     Review of Systems:  General: denies fatigue/malaise or weight loss  Cardiac: Denies chest pain, palpitations  GI: denies nausea/vomiting/constipation  : Normal urine output. Denies history of gross hematuria, urinary retention, or recurrent UTIs.  Vascular: No active claudication or non-healing LE wounds.  Functional Status: Can walk up 2 flights of stairs     Family History:  Mother:  age 71 years; CAD  Father: hx unknown  Sibling: brother struck and killed by semi    Social History:  Marital Status: accompanied by long-term significant  other  Occupation: retired  Tobacco: quit 6 years ago; ~1 PPD x 40 yrs  EtOH: per HPI  Illicits: none     Physical Exam:  Gen: A+OX3; NAD  HEENT: PERRL, sclera anicteric, MMM  Cardiac: RRR  Chest: Normal inspiratory effort  Abdomen: S/NT/ND.  Ext: No LE edema  Vascular: 2+ palpable femoral pulses  Psychiatric: Normal mood, affect     Assessment/Plan:  - Chest CT pending Monday for staging  - Has had recent c-scope; never had EGD  - Will present to [facility] MDT Monday; given high posterior location and at least F4 fibrosis vs cirrhosis, favor Y-90 embolization vs. surgical resection assuming chest CT negative for systemic disease  - MRI liver will review MDT Monday  - Patient would prefer IR locally if that is MDT rec      Time Attestation:  I spent 60 minutes with the patient, over 50 minutes in counseling and education as outlined above.     Yudy Young MD, PHD, MPH, FACS  Chief of Transplant and Hepatobiliary Surgery

## 2024-08-26 ENCOUNTER — APPOINTMENT (OUTPATIENT)
Dept: HEMATOLOGY/ONCOLOGY | Facility: HOSPITAL | Age: 59
End: 2024-08-26
Payer: COMMERCIAL

## 2024-08-27 DIAGNOSIS — C22.0 HEPATOCELLULAR CARCINOMA (MULTI): ICD-10-CM

## 2024-09-05 ENCOUNTER — HOSPITAL ENCOUNTER (OUTPATIENT)
Dept: RADIOLOGY | Facility: HOSPITAL | Age: 59
Discharge: HOME | End: 2024-09-05
Payer: COMMERCIAL

## 2024-09-05 VITALS
BODY MASS INDEX: 31.24 KG/M2 | DIASTOLIC BLOOD PRESSURE: 94 MMHG | HEART RATE: 66 BPM | SYSTOLIC BLOOD PRESSURE: 174 MMHG | TEMPERATURE: 98.1 F | HEIGHT: 64 IN | WEIGHT: 182.98 LBS

## 2024-09-05 DIAGNOSIS — C22.0 HEPATOCELLULAR CARCINOMA (MULTI): ICD-10-CM

## 2024-09-05 PROCEDURE — 99243 OFF/OP CNSLTJ NEW/EST LOW 30: CPT | Performed by: RADIOLOGY

## 2024-09-05 ASSESSMENT — ENCOUNTER SYMPTOMS
COUGH: 0
BACK PAIN: 0
CHEST TIGHTNESS: 0
ARTHRALGIAS: 0
UNEXPECTED WEIGHT CHANGE: 0
ABDOMINAL PAIN: 0
HEMATURIA: 0
FATIGUE: 0
DIFFICULTY URINATING: 0
APNEA: 0
BLOOD IN STOOL: 0
ABDOMINAL DISTENTION: 0
ACTIVITY CHANGE: 0
WHEEZING: 0
APPETITE CHANGE: 0
DYSURIA: 0
PALPITATIONS: 0

## 2024-09-05 ASSESSMENT — PAIN SCALES - GENERAL: PAINLEVEL_OUTOF10: 0 - NO PAIN

## 2024-09-05 ASSESSMENT — PAIN - FUNCTIONAL ASSESSMENT: PAIN_FUNCTIONAL_ASSESSMENT: 0-10

## 2024-09-05 NOTE — H&P
History Of Present Illness  Robert Villalta is a 58 y.o. male presenting with a history of HCV and heavy alcohol use with cirrhotic appearing liver on imaging. Screening ultrasound reveals a liver lesion which was follow up by MRI demonstrating a 4 cm enhancing lesion in segment 7 consistent with HCC. Met with our transplant surgeon and discussed at tumor board. Recommendation is for Y90 evaluation and treatment. Patient has a history of heavy alcohol use which has decreased, but he does still drink. Quit smoking about 7 years ago. No abdominal pain, bloating, ascites, hematemesis, confusion. Followed at the [facility] by Dr. Greer.    Past Medical History  No past medical history on file.    Surgical History  No past surgical history on file.     Social History  He reports that he has never smoked. He has never used smokeless tobacco. He reports current alcohol use of about 1.0 standard drink of alcohol per week. He reports current drug use. Drug: Marijuana.    Family History  No family history on file.     Allergies  Patient has no known allergies.    Review of Systems   Constitutional:  Negative for activity change, appetite change, fatigue and unexpected weight change.   Respiratory:  Negative for apnea, cough, chest tightness and wheezing.    Cardiovascular:  Negative for chest pain, palpitations and leg swelling.   Gastrointestinal:  Negative for abdominal distention, abdominal pain and blood in stool.   Genitourinary:  Negative for difficulty urinating, dysuria and hematuria.   Musculoskeletal:  Negative for arthralgias and back pain.        Physical Exam  Vitals reviewed.   Constitutional:       General: He is not in acute distress.     Appearance: Normal appearance. He is not ill-appearing.   HENT:      Head: Normocephalic and atraumatic.   Eyes:      General: No scleral icterus.  Cardiovascular:      Rate and Rhythm: Normal rate and regular rhythm.   Pulmonary:      Effort: Pulmonary effort is  "normal.      Breath sounds: Normal breath sounds.   Abdominal:      Palpations: Abdomen is soft. There is no mass.      Tenderness: There is no abdominal tenderness. There is no guarding.   Neurological:      Mental Status: He is alert.          Last Recorded Vitals  Blood pressure (!) 174/94, pulse 66, temperature 36.7 °C (98.1 °F), height 1.615 m (5' 3.58\"), weight 83 kg (182 lb 15.7 oz).    Relevant Results  I personally reviewed and interpreted the MRI from 7/24/24: 4 cm arterially enhancing liver lesion in segment 7 c/w HCC. Background liver cirrhosis/fibrosis. Numerous additional regenerative nodules. No evidence of metastatic disease. No ascites or stigmata of portal hypertension.        Assessment/Plan   Assessment & Plan  Hepatocellular carcinoma (Multi)    I had a discussion with the patient about the nature of radioembolization and how it may be used to treat patients with hepatocellular carcinoma who are not candidate for surgical resection. I discussed with the patient the risks of the procedure, including the risk of off target radioembolization, radiation induced pulmonary fibrosis, and radiation induced liver disease including hepatic failure. I discussed the two-step process of radioembolization, including the pre treatment MAA procedure done for the purposes of dosimetry, evaluating a lung shunt fraction, and identifying potential collateral arteries. I also discussed the nature of post embolic syndrome and the course for treating the symptoms. The patient was understanding and asked appropriate questions about the procedure, its risks, and benefits.        I spent 40 minutes in the professional and overall care of this patient.      Lukas Bartholomew MD    "

## 2024-09-06 ENCOUNTER — PREP FOR PROCEDURE (OUTPATIENT)
Dept: RADIOLOGY | Facility: HOSPITAL | Age: 59
End: 2024-09-06
Payer: COMMERCIAL

## 2024-09-06 DIAGNOSIS — C22.0 HEPATOCELLULAR CARCINOMA (MULTI): Primary | ICD-10-CM

## 2024-09-10 ENCOUNTER — PATIENT OUTREACH (OUTPATIENT)
Dept: HEMATOLOGY/ONCOLOGY | Facility: HOSPITAL | Age: 59
End: 2024-09-10
Payer: COMMERCIAL

## 2024-09-10 NOTE — PROGRESS NOTES
Images for 8/26/24 CT C/A/P from [facility] requested in PACS.

## 2024-09-12 SDOH — ECONOMIC STABILITY: TRANSPORTATION INSECURITY
IN THE PAST 12 MONTHS, HAS THE LACK OF TRANSPORTATION KEPT YOU FROM MEDICAL APPOINTMENTS OR FROM GETTING MEDICATIONS?: YES

## 2024-09-12 SDOH — ECONOMIC STABILITY: FOOD INSECURITY: WITHIN THE PAST 12 MONTHS, THE FOOD YOU BOUGHT JUST DIDN'T LAST AND YOU DIDN'T HAVE MONEY TO GET MORE.: SOMETIMES TRUE

## 2024-09-12 SDOH — HEALTH STABILITY: MENTAL HEALTH: HOW MANY STANDARD DRINKS CONTAINING ALCOHOL DO YOU HAVE ON A TYPICAL DAY?: 3 OR 4

## 2024-09-12 SDOH — ECONOMIC STABILITY: TRANSPORTATION INSECURITY
IN THE PAST 12 MONTHS, HAS LACK OF TRANSPORTATION KEPT YOU FROM MEETINGS, WORK, OR FROM GETTING THINGS NEEDED FOR DAILY LIVING?: YES

## 2024-09-12 SDOH — HEALTH STABILITY: MENTAL HEALTH
STRESS IS WHEN SOMEONE FEELS TENSE, NERVOUS, ANXIOUS, OR CAN'T SLEEP AT NIGHT BECAUSE THEIR MIND IS TROUBLED. HOW STRESSED ARE YOU?: NOT AT ALL

## 2024-09-12 SDOH — ECONOMIC STABILITY: INCOME INSECURITY: IN THE PAST 12 MONTHS, HAS THE ELECTRIC, GAS, OIL, OR WATER COMPANY THREATENED TO SHUT OFF SERVICE IN YOUR HOME?: NO

## 2024-09-12 SDOH — HEALTH STABILITY: PHYSICAL HEALTH: ON AVERAGE, HOW MANY DAYS PER WEEK DO YOU ENGAGE IN MODERATE TO STRENUOUS EXERCISE (LIKE A BRISK WALK)?: 2 DAYS

## 2024-09-12 SDOH — ECONOMIC STABILITY: INCOME INSECURITY: HOW HARD IS IT FOR YOU TO PAY FOR THE VERY BASICS LIKE FOOD, HOUSING, MEDICAL CARE, AND HEATING?: VERY HARD

## 2024-09-12 SDOH — SOCIAL STABILITY: SOCIAL INSECURITY
WITHIN THE LAST YEAR, HAVE YOU BEEN KICKED, HIT, SLAPPED, OR OTHERWISE PHYSICALLY HURT BY YOUR PARTNER OR EX-PARTNER?: NO

## 2024-09-12 SDOH — HEALTH STABILITY: MENTAL HEALTH: HOW OFTEN DO YOU HAVE 6 OR MORE DRINKS ON ONE OCCASION?: MONTHLY

## 2024-09-12 SDOH — SOCIAL STABILITY: SOCIAL INSECURITY
WITHIN THE LAST YEAR, HAVE TO BEEN RAPED OR FORCED TO HAVE ANY KIND OF SEXUAL ACTIVITY BY YOUR PARTNER OR EX-PARTNER?: NO

## 2024-09-12 SDOH — SOCIAL STABILITY: SOCIAL INSECURITY: WITHIN THE LAST YEAR, HAVE YOU BEEN AFRAID OF YOUR PARTNER OR EX-PARTNER?: NO

## 2024-09-12 SDOH — ECONOMIC STABILITY: HOUSING INSECURITY: AT ANY TIME IN THE PAST 12 MONTHS, WERE YOU HOMELESS OR LIVING IN A SHELTER (INCLUDING NOW)?: NO

## 2024-09-12 SDOH — ECONOMIC STABILITY: FOOD INSECURITY: WITHIN THE PAST 12 MONTHS, YOU WORRIED THAT YOUR FOOD WOULD RUN OUT BEFORE YOU GOT MONEY TO BUY MORE.: SOMETIMES TRUE

## 2024-09-12 SDOH — HEALTH STABILITY: MENTAL HEALTH
HOW OFTEN DO YOU NEED TO HAVE SOMEONE HELP YOU WHEN YOU READ INSTRUCTIONS, PAMPHLETS, OR OTHER WRITTEN MATERIAL FROM YOUR DOCTOR OR PHARMACY?: NEVER

## 2024-09-12 SDOH — SOCIAL STABILITY: SOCIAL NETWORK: HOW OFTEN DO YOU GET TOGETHER WITH FRIENDS OR RELATIVES?: TWICE A WEEK

## 2024-09-12 SDOH — ECONOMIC STABILITY: INCOME INSECURITY: IN THE LAST 12 MONTHS, WAS THERE A TIME WHEN YOU WERE NOT ABLE TO PAY THE MORTGAGE OR RENT ON TIME?: NO

## 2024-09-12 SDOH — SOCIAL STABILITY: SOCIAL INSECURITY: WITHIN THE LAST YEAR, HAVE YOU BEEN HUMILIATED OR EMOTIONALLY ABUSED IN OTHER WAYS BY YOUR PARTNER OR EX-PARTNER?: NO

## 2024-09-12 SDOH — SOCIAL STABILITY: SOCIAL NETWORK: IN A TYPICAL WEEK, HOW MANY TIMES DO YOU TALK ON THE PHONE WITH FAMILY, FRIENDS, OR NEIGHBORS?: NEVER

## 2024-09-12 SDOH — SOCIAL STABILITY: SOCIAL NETWORK: ARE YOU MARRIED, WIDOWED, DIVORCED, SEPARATED, NEVER MARRIED, OR LIVING WITH A PARTNER?: LIVING WITH PARTNER

## 2024-09-12 SDOH — HEALTH STABILITY: MENTAL HEALTH: HOW OFTEN DO YOU HAVE A DRINK CONTAINING ALCOHOL?: 2-3 TIMES A WEEK

## 2024-09-12 SDOH — SOCIAL STABILITY: SOCIAL NETWORK
DO YOU BELONG TO ANY CLUBS OR ORGANIZATIONS SUCH AS CHURCH GROUPS UNIONS, FRATERNAL OR ATHLETIC GROUPS, OR SCHOOL GROUPS?: NO

## 2024-09-12 SDOH — SOCIAL STABILITY: SOCIAL NETWORK: HOW OFTEN DO YOU ATTENT MEETINGS OF THE CLUB OR ORGANIZATION YOU BELONG TO?: NEVER

## 2024-09-12 SDOH — SOCIAL STABILITY: SOCIAL NETWORK: HOW OFTEN DO YOU ATTEND CHURCH OR RELIGIOUS SERVICES?: NEVER

## 2024-09-12 SDOH — ECONOMIC STABILITY: HOUSING INSECURITY: IN THE PAST 12 MONTHS, HOW MANY TIMES HAVE YOU MOVED WHERE YOU WERE LIVING?: 0

## 2024-09-12 SDOH — HEALTH STABILITY: PHYSICAL HEALTH: ON AVERAGE, HOW MANY MINUTES DO YOU ENGAGE IN EXERCISE AT THIS LEVEL?: 10 MIN

## 2024-09-12 ASSESSMENT — LIFESTYLE VARIABLES
AUDIT-C TOTAL SCORE: 6
SKIP TO QUESTIONS 9-10: 0

## 2024-09-12 NOTE — PROGRESS NOTES
Called Robert regarding his virtual NPV with Dr. Black on 9/24 at 11:00. Confirmed mobile phone number; text sent to sign up for [employer]. He states that his girlfriend will help him set it up.     Robert stated that neither he nor his girlfriend are working at this time, so finances are a concern as they live off social security income. He does receive SNAP which helps somewhat. He does not have a rent/mortgage payment on his home and denies any concerns about paying for utilities. Transportation is not usually an issue, but currently he is having transmission problems and will be taking his car in to the shop next week. He declines to speak with SW at this time, but is open to a conversation following his NPV to see if there are any additional resources he may need. He states that he quit smoking about 7 years ago and is trying to cut back on drinking since his liver cancer diagnosis. He denies questions at this time.

## 2024-09-23 ENCOUNTER — PREP FOR PROCEDURE (OUTPATIENT)
Dept: RADIOLOGY | Facility: HOSPITAL | Age: 59
End: 2024-09-23
Payer: COMMERCIAL

## 2024-09-24 ENCOUNTER — TELEMEDICINE (OUTPATIENT)
Dept: HEMATOLOGY/ONCOLOGY | Facility: HOSPITAL | Age: 59
End: 2024-09-24
Payer: COMMERCIAL

## 2024-09-24 DIAGNOSIS — C22.0 HEPATOCELLULAR CARCINOMA (MULTI): ICD-10-CM

## 2024-09-24 PROCEDURE — 99205 OFFICE O/P NEW HI 60 MIN: CPT | Performed by: INTERNAL MEDICINE

## 2024-09-24 ASSESSMENT — ENCOUNTER SYMPTOMS
MUSCULOSKELETAL NEGATIVE: 1
HEMATOLOGIC/LYMPHATIC NEGATIVE: 1
CHILLS: 0
FEVER: 0
EYES NEGATIVE: 1
VOMITING: 0
NAUSEA: 0
ABDOMINAL PAIN: 0
CARDIOVASCULAR NEGATIVE: 1
RESPIRATORY NEGATIVE: 1
ENDOCRINE NEGATIVE: 1
NEUROLOGICAL NEGATIVE: 1
PSYCHIATRIC NEGATIVE: 1

## 2024-09-24 NOTE — PROGRESS NOTES
Robert was seen today for a virtual new patient visit. Dr. Black has ordered scans and labs to be done the week of 1/21/24 and he would like a virtual FUV scheduled the week of 1/28/24. Appointment orders are active; message sent to scheduling team.

## 2024-09-25 NOTE — PROGRESS NOTES
Patient ID: Robert Villalta is a 58 y.o. male.  Date of Service: 09/24/24  Referring Physician: Yudy Young MD  [id number]  [place] [address]  Primary Care Provider: Dominic Brambila DO      Subjective    Oncology History   Hepatocellular carcinoma (Multi)   8/23/2024 Initial Diagnosis    Hepatocellular carcinoma (Multi)    History of hepatitis C and alcohol use. Underwent ultrasound on 4/11/2024 which showed cirrhosis with questionable lesion.  MR abdomen on 7/24/2024 demonstrated arterial enhancing lesion with washout involving segment 7 of the liver which is partially exophytic measuring 4 cm in greatest dimension consistent with hepatocellular cancer.  AFP was elevated at 505.  Patient case was discussed at tumor board and recommendation was to consider y90 radioembolization         HPI  Virtual visit  Patient is here to consider further treatment option.  Overall he is feeling well.    Review of Systems   Constitutional:  Negative for chills and fever.   HENT:  Negative.     Eyes: Negative.    Respiratory: Negative.     Cardiovascular: Negative.    Gastrointestinal:  Negative for abdominal pain, nausea and vomiting.   Endocrine: Negative.    Genitourinary: Negative.     Musculoskeletal: Negative.    Skin: Negative.    Neurological: Negative.    Hematological: Negative.    Psychiatric/Behavioral: Negative.     All other systems reviewed and are negative.         Patient Active Problem List   Diagnosis    Hepatocellular carcinoma (Multi)    Chronic hepatitis C without hepatic coma (Multi)     No past medical history on file.  No past surgical history on file.  No family history on file.    Current Outpatient Medications:     metoprolol tartrate (Lopressor) 50 mg tablet, Take 1 tablet by mouth 2 times a day., Disp: , Rfl:       Objective   BSA: There is no height or weight on file to calculate BSA.  There were no vitals taken for this visit.    Performance  Status:  Symptomatic; fully ambulatory    Physical Exam  Virtual visit      Assessment/Plan       #1 history of hepatitis C and alcohol use  #2 hepatocellular cancer    Patient is a 58 y.o. male with recently diagnosed hepatocellular cancer.  His case was discussed in the tumor board.  The recommendation was to consider Y90 radioembolization.  Patient does have highly elevated AFP of 505.  He may not be a great surgical candidate.  With such a high elevated AFP there is concern for high risk disease.  We will recommend close monitoring.  I did discuss the role of locoregional therapy with systemic therapy where we have seen recurrence free survival benefit but overall survival is not mature yet.  At this time plan is for patient to undergo Y90 radioembolization followed with surveillance imaging.  Patient will be reevaluated in 3 to 4 months.      Plan was discussed in detail with the patient and patient was in agreement with the plan of care.           Bartolo Black MD

## 2024-10-01 ENCOUNTER — APPOINTMENT (OUTPATIENT)
Dept: RADIOLOGY | Facility: HOSPITAL | Age: 59
End: 2024-10-01
Payer: COMMERCIAL

## 2024-10-08 ENCOUNTER — HOSPITAL ENCOUNTER (OUTPATIENT)
Dept: RADIOLOGY | Facility: HOSPITAL | Age: 59
Discharge: HOME | End: 2024-10-08
Payer: COMMERCIAL

## 2024-10-08 ENCOUNTER — APPOINTMENT (OUTPATIENT)
Dept: RADIOLOGY | Facility: HOSPITAL | Age: 59
End: 2024-10-08
Payer: COMMERCIAL

## 2024-10-10 ENCOUNTER — PREP FOR PROCEDURE (OUTPATIENT)
Dept: RADIOLOGY | Facility: HOSPITAL | Age: 59
End: 2024-10-10

## 2024-10-10 ENCOUNTER — APPOINTMENT (OUTPATIENT)
Dept: RADIOLOGY | Facility: HOSPITAL | Age: 59
End: 2024-10-10
Payer: COMMERCIAL

## 2024-10-10 ENCOUNTER — HOSPITAL ENCOUNTER (OUTPATIENT)
Dept: RADIOLOGY | Facility: HOSPITAL | Age: 59
Discharge: HOME | End: 2024-10-10
Payer: COMMERCIAL

## 2024-10-10 VITALS
HEART RATE: 58 BPM | RESPIRATION RATE: 14 BRPM | TEMPERATURE: 97.2 F | SYSTOLIC BLOOD PRESSURE: 181 MMHG | OXYGEN SATURATION: 94 % | WEIGHT: 180 LBS | DIASTOLIC BLOOD PRESSURE: 90 MMHG | HEIGHT: 63 IN | BODY MASS INDEX: 31.89 KG/M2

## 2024-10-10 DIAGNOSIS — C22.0 HEPATOCELLULAR CARCINOMA (MULTI): ICD-10-CM

## 2024-10-10 DIAGNOSIS — C22.0 HEPATOCELLULAR CARCINOMA (MULTI): Primary | ICD-10-CM

## 2024-10-10 LAB
ALBUMIN SERPL BCP-MCNC: 4.1 G/DL (ref 3.4–5)
ALP SERPL-CCNC: 45 U/L (ref 33–120)
ALT SERPL W P-5'-P-CCNC: 45 U/L (ref 10–52)
ANION GAP SERPL CALC-SCNC: 13 MMOL/L (ref 10–20)
AST SERPL W P-5'-P-CCNC: 39 U/L (ref 9–39)
BILIRUB SERPL-MCNC: 0.9 MG/DL (ref 0–1.2)
BUN SERPL-MCNC: 15 MG/DL (ref 6–23)
CALCIUM SERPL-MCNC: 9.7 MG/DL (ref 8.6–10.3)
CHLORIDE SERPL-SCNC: 104 MMOL/L (ref 98–107)
CO2 SERPL-SCNC: 25 MMOL/L (ref 21–32)
CREAT SERPL-MCNC: 0.9 MG/DL (ref 0.5–1.3)
EGFRCR SERPLBLD CKD-EPI 2021: >90 ML/MIN/1.73M*2
ERYTHROCYTE [DISTWIDTH] IN BLOOD BY AUTOMATED COUNT: 11.9 % (ref 11.5–14.5)
GLUCOSE SERPL-MCNC: 110 MG/DL (ref 74–99)
HCT VFR BLD AUTO: 47.9 % (ref 41–52)
HGB BLD-MCNC: 17.2 G/DL (ref 13.5–17.5)
INR PPP: 1.1 (ref 0.9–1.1)
MCH RBC QN AUTO: 34.3 PG (ref 26–34)
MCHC RBC AUTO-ENTMCNC: 35.9 G/DL (ref 32–36)
MCV RBC AUTO: 96 FL (ref 80–100)
NRBC BLD-RTO: 0 /100 WBCS (ref 0–0)
PLATELET # BLD AUTO: 130 X10*3/UL (ref 150–450)
POTASSIUM SERPL-SCNC: 3.8 MMOL/L (ref 3.5–5.3)
PROT SERPL-MCNC: 7.9 G/DL (ref 6.4–8.2)
PROTHROMBIN TIME: 12.3 SECONDS (ref 9.8–12.8)
RBC # BLD AUTO: 5.01 X10*6/UL (ref 4.5–5.9)
SODIUM SERPL-SCNC: 138 MMOL/L (ref 136–145)
WBC # BLD AUTO: 6.7 X10*3/UL (ref 4.4–11.3)

## 2024-10-10 PROCEDURE — 36247 INS CATH ABD/L-EXT ART 3RD: CPT | Mod: RT | Performed by: RADIOLOGY

## 2024-10-10 PROCEDURE — 36245 INS CATH ABD/L-EXT ART 1ST: CPT | Performed by: RADIOLOGY

## 2024-10-10 PROCEDURE — 85027 COMPLETE CBC AUTOMATED: CPT | Performed by: RADIOLOGY

## 2024-10-10 PROCEDURE — C1769 GUIDE WIRE: HCPCS

## 2024-10-10 PROCEDURE — 75894 X-RAYS TRANSCATH THERAPY: CPT | Mod: RT | Performed by: RADIOLOGY

## 2024-10-10 PROCEDURE — 99153 MOD SED SAME PHYS/QHP EA: CPT | Performed by: RADIOLOGY

## 2024-10-10 PROCEDURE — 37243 VASC EMBOLIZE/OCCLUDE ORGAN: CPT | Mod: RT | Performed by: RADIOLOGY

## 2024-10-10 PROCEDURE — 36247 INS CATH ABD/L-EXT ART 3RD: CPT | Performed by: RADIOLOGY

## 2024-10-10 PROCEDURE — 36248 INS CATH ABD/L-EXT ART ADDL: CPT

## 2024-10-10 PROCEDURE — 2500000005 HC RX 250 GENERAL PHARMACY W/O HCPCS: Performed by: RADIOLOGY

## 2024-10-10 PROCEDURE — 76937 US GUIDE VASCULAR ACCESS: CPT | Mod: RT | Performed by: RADIOLOGY

## 2024-10-10 PROCEDURE — C1760 CLOSURE DEV, VASC: HCPCS

## 2024-10-10 PROCEDURE — 99153 MOD SED SAME PHYS/QHP EA: CPT

## 2024-10-10 PROCEDURE — 2550000001 HC RX 255 CONTRASTS: Performed by: RADIOLOGY

## 2024-10-10 PROCEDURE — 2500000004 HC RX 250 GENERAL PHARMACY W/ HCPCS (ALT 636 FOR OP/ED): Performed by: RADIOLOGY

## 2024-10-10 PROCEDURE — 75710 ARTERY X-RAYS ARM/LEG: CPT | Mod: RT | Performed by: RADIOLOGY

## 2024-10-10 PROCEDURE — 75726 ARTERY X-RAYS ABDOMEN: CPT | Performed by: RADIOLOGY

## 2024-10-10 PROCEDURE — 7100000010 HC PHASE TWO TIME - EACH INCREMENTAL 1 MINUTE

## 2024-10-10 PROCEDURE — 37243 VASC EMBOLIZE/OCCLUDE ORGAN: CPT | Performed by: RADIOLOGY

## 2024-10-10 PROCEDURE — 99152 MOD SED SAME PHYS/QHP 5/>YRS: CPT

## 2024-10-10 PROCEDURE — 76937 US GUIDE VASCULAR ACCESS: CPT | Performed by: RADIOLOGY

## 2024-10-10 PROCEDURE — 80053 COMPREHEN METABOLIC PANEL: CPT | Performed by: RADIOLOGY

## 2024-10-10 PROCEDURE — 7100000009 HC PHASE TWO TIME - INITIAL BASE CHARGE

## 2024-10-10 PROCEDURE — 2720000007 HC OR 272 NO HCPCS

## 2024-10-10 PROCEDURE — 75726 ARTERY X-RAYS ABDOMEN: CPT | Mod: RT | Performed by: RADIOLOGY

## 2024-10-10 PROCEDURE — 96375 TX/PRO/DX INJ NEW DRUG ADDON: CPT

## 2024-10-10 PROCEDURE — 36415 COLL VENOUS BLD VENIPUNCTURE: CPT | Performed by: RADIOLOGY

## 2024-10-10 PROCEDURE — 85610 PROTHROMBIN TIME: CPT | Performed by: RADIOLOGY

## 2024-10-10 PROCEDURE — 96413 CHEMO IV INFUSION 1 HR: CPT

## 2024-10-10 PROCEDURE — 99152 MOD SED SAME PHYS/QHP 5/>YRS: CPT | Performed by: RADIOLOGY

## 2024-10-10 PROCEDURE — 2780000003 HC OR 278 NO HCPCS

## 2024-10-10 PROCEDURE — 2500000004 HC RX 250 GENERAL PHARMACY W/ HCPCS (ALT 636 FOR OP/ED): Performed by: NURSE PRACTITIONER

## 2024-10-10 RX ORDER — DOXORUBICIN HYDROCHLORIDE 2 MG/ML
50 INJECTION, SOLUTION INTRAVENOUS ONCE
Status: COMPLETED | OUTPATIENT
Start: 2024-10-10 | End: 2024-10-10

## 2024-10-10 RX ORDER — HYDROMORPHONE HYDROCHLORIDE 1 MG/ML
0.2 INJECTION, SOLUTION INTRAMUSCULAR; INTRAVENOUS; SUBCUTANEOUS
Status: DISCONTINUED | OUTPATIENT
Start: 2024-10-10 | End: 2024-10-11 | Stop reason: HOSPADM

## 2024-10-10 RX ORDER — MIDAZOLAM HYDROCHLORIDE 1 MG/ML
INJECTION, SOLUTION INTRAMUSCULAR; INTRAVENOUS
Status: COMPLETED | OUTPATIENT
Start: 2024-10-10 | End: 2024-10-10

## 2024-10-10 RX ORDER — HYDROMORPHONE HYDROCHLORIDE 1 MG/ML
0.5 INJECTION, SOLUTION INTRAMUSCULAR; INTRAVENOUS; SUBCUTANEOUS ONCE
Status: DISCONTINUED | OUTPATIENT
Start: 2024-10-10 | End: 2024-10-11 | Stop reason: HOSPADM

## 2024-10-10 RX ORDER — FENTANYL CITRATE 50 UG/ML
INJECTION, SOLUTION INTRAMUSCULAR; INTRAVENOUS
Status: COMPLETED | OUTPATIENT
Start: 2024-10-10 | End: 2024-10-10

## 2024-10-10 RX ORDER — KETOROLAC TROMETHAMINE 30 MG/ML
30 INJECTION, SOLUTION INTRAMUSCULAR; INTRAVENOUS ONCE
Status: COMPLETED | OUTPATIENT
Start: 2024-10-10 | End: 2024-10-10

## 2024-10-10 RX ORDER — OXYCODONE HYDROCHLORIDE 5 MG/1
5 TABLET ORAL EVERY 6 HOURS PRN
Qty: 12 TABLET | Refills: 0 | Status: SHIPPED | OUTPATIENT
Start: 2024-10-10 | End: 2024-10-17

## 2024-10-10 RX ORDER — OXYCODONE AND ACETAMINOPHEN 5; 325 MG/1; MG/1
1 TABLET ORAL EVERY 6 HOURS PRN
Status: DISCONTINUED | OUTPATIENT
Start: 2024-10-10 | End: 2024-10-11 | Stop reason: HOSPADM

## 2024-10-10 RX ORDER — SODIUM CHLORIDE 9 MG/ML
50 INJECTION, SOLUTION INTRAVENOUS CONTINUOUS
Status: DISCONTINUED | OUTPATIENT
Start: 2024-10-10 | End: 2024-10-10

## 2024-10-10 RX ORDER — KETOROLAC TROMETHAMINE 30 MG/ML
INJECTION, SOLUTION INTRAMUSCULAR; INTRAVENOUS
Status: COMPLETED | OUTPATIENT
Start: 2024-10-10 | End: 2024-10-10

## 2024-10-10 RX ORDER — LIDOCAINE HYDROCHLORIDE 10 MG/ML
INJECTION, SOLUTION EPIDURAL; INFILTRATION; INTRACAUDAL; PERINEURAL
Status: COMPLETED | OUTPATIENT
Start: 2024-10-10 | End: 2024-10-10

## 2024-10-10 ASSESSMENT — PAIN SCALES - GENERAL
PAINLEVEL_OUTOF10: 8
PAINLEVEL_OUTOF10: 0 - NO PAIN
PAINLEVEL_OUTOF10: 0 - NO PAIN
PAINLEVEL_OUTOF10: 8
PAINLEVEL_OUTOF10: 0 - NO PAIN
PAINLEVEL_OUTOF10: 8
PAINLEVEL_OUTOF10: 0 - NO PAIN
PAINLEVEL_OUTOF10: 9
PAINLEVEL_OUTOF10: 10 - WORST POSSIBLE PAIN
PAINLEVEL_OUTOF10: 0 - NO PAIN
PAINLEVEL_OUTOF10: 6
PAINLEVEL_OUTOF10: 8
PAINLEVEL_OUTOF10: 0 - NO PAIN
PAINLEVEL_OUTOF10: 0 - NO PAIN
PAINLEVEL_OUTOF10: 7
PAINLEVEL_OUTOF10: 0 - NO PAIN
PAINLEVEL_OUTOF10: 5 - MODERATE PAIN
PAINLEVEL_OUTOF10: 5 - MODERATE PAIN
PAINLEVEL_OUTOF10: 0 - NO PAIN
PAINLEVEL_OUTOF10: 8
PAINLEVEL_OUTOF10: 6
PAINLEVEL_OUTOF10: 10 - WORST POSSIBLE PAIN
PAINLEVEL_OUTOF10: 7
PAINLEVEL_OUTOF10: 0 - NO PAIN
PAINLEVEL_OUTOF10: 7

## 2024-10-10 ASSESSMENT — PAIN DESCRIPTION - LOCATION
LOCATION: NECK
LOCATION: ABDOMEN

## 2024-10-10 ASSESSMENT — PAIN DESCRIPTION - DESCRIPTORS
DESCRIPTORS: SHARP

## 2024-10-10 ASSESSMENT — PAIN - FUNCTIONAL ASSESSMENT
PAIN_FUNCTIONAL_ASSESSMENT: 0-10

## 2024-10-10 ASSESSMENT — PAIN SCALES - WONG BAKER: WONGBAKER_NUMERICALRESPONSE: HURTS EVEN MORE

## 2024-10-10 ASSESSMENT — PAIN DESCRIPTION - ORIENTATION: ORIENTATION: OTHER (COMMENT)

## 2024-10-10 NOTE — PRE-PROCEDURE NOTE
Interventional Radiology Preprocedure Note    Indication for procedure: The encounter diagnosis was Hepatocellular carcinoma (Multi). 4 cm segment 7 HCC. Due to insurance issues we are changing our approach to loco-regional therapy and will perform TACE instead of Y90 therapy. This was discussed with Dr. Black, the patient's oncologist, who has co signed the doxorubicin. I discussed the change with the patient. The data does not show superiority of one treatment over the other (and the TACE has a greater volume of data) although the TACE can result in greater post-embolic syndrome symptoms and a small amount of systemic chemotherapy. I discussed this with him as well. Hepatic function panel normal today.    Relevant review of systems: NA    Relevant Labs:   Lab Results   Component Value Date    CREATININE 0.90 10/10/2024    EGFR >90 10/10/2024    INR 1.1 10/10/2024    PROTIME 12.3 10/10/2024       Planned Sedation/Anesthesia: Moderate    Airway assessment: normal    Directed physical examination:    Awake and alert, oriented  No acute distress  Regular rate, rhythm  Breathing non-labored    Mallampati: II (hard and soft palate, upper portion of tonsils and uvula visible)    ASA Score: ASA 3 - Patient with moderate systemic disease with functional limitations    Benefits, risks and alternatives of procedure and planned sedation have been discussed with the patient and/or their representative. All questions answered and they agree to proceed.

## 2024-10-10 NOTE — Clinical Note
Patient escorted to bathroom then back to angiography room 4 and assisted onto table. Patient being shaved and prepared for procedure by Technologist.

## 2024-10-10 NOTE — POST-PROCEDURE NOTE
Interventional Radiology Brief Postprocedure Note    Attending: Lukas Bartholomew MD      Assistant: none    Diagnosis: Hepatocellular carcinoma    Description of procedure: Segment 7 enhancing mass consistent with prior imaging showing 4 cm HCC in this location. JCARLOS-TACE was performed and hemostasis was achieved in the supplying Segment 7 branch.     Anesthesia:  Moderate sedation    Complications: None    Estimated Blood Loss: minimal    Medications (Filter: Administrations occurring from 0809 to 0953 on 10/10/24) As of 10/10/24 0953      DOXOrubicin (Adriamycin) IV syringe 50 mg 25 mL (mg) Total dose:  100 mg Dosing weight:  81.6      Date/Time Rate/Dose/Volume Action       10/10/24  0919 50 mg (over 15 min) Given      0922 50 mg (over 15 min) Given               oxygen (O2) therapy (L/min) Total volume:  Not documented*   *Total volume has not been documented. View each administration to see the amount administered.     Date/Time Rate/Dose/Volume Action       10/10/24  0812 3 L/min - 180,000 mL/hr New Bag      0943  Stopped               midazolam (Versed) injection (mg) Total dose:  3 mg      Date/Time Rate/Dose/Volume Action       10/10/24  0827 2 mg Given      0845 1 mg Given               fentaNYL PF (Sublimaze) injection (mcg) Total dose:  100 mcg      Date/Time Rate/Dose/Volume Action       10/10/24  0828 50 mcg Given      0914 50 mcg Given               lidocaine PF (Xylocaine) 10 mg/mL (1 %) injection (mL) Total volume:  6 mL      Date/Time Rate/Dose/Volume Action       10/10/24  0842 6 mL Given               ketorolac (Toradol) injection (mg) Total dose:  30 mg      Date/Time Rate/Dose/Volume Action       10/10/24  0930 30 mg Given               iohexol (OMNIPaque) 350 mg iodine/mL solution 200 mL (mL) Total volume:  200 mL Dosing weight:  81.6      Date/Time Rate/Dose/Volume Action       10/10/24  0949 200 mL Given                   No specimens collected      See detailed result report with images  in PACS.    The patient tolerated the procedure well without incident or complication and is in stable condition.

## 2024-10-10 NOTE — DISCHARGE INSTRUCTIONS
FOR SUDDEN AND SEVERE CHEST PAIN, SHORTNESS OF BREATH, EXCESSIVE BLEEDING, SIGNS OF STROKE, OR CHANGES IN MENTAL STATUS YOU SHOULD CALL 911 IMMEDIATELY.   cardiologist first. If any of these were prescribed, you must take them every day without missing a single dose. If you are getting low on these medications, contact your provider immediately for a refill.     FOR NEXT 24 HOURS  - Upon discharge, you should return home and rest for the remainder of the day and evening. You do not have to stay on bed rest but should not be very active.  It is recommended a responsible adult be with you for the first 24 hours after the procedure.    - No driving for 24 hours after procedure. Please arrange for someone to drive you home from the hospital today.     - Do not drive, operate machinery, or use power tools for 24 hours after your procedure.     - Do not make any legal decisions for 24 hours after your procedure.     - Do not drink alcoholic beverages for 24 hours after your procedure.    WOUND CARE   *FOR FEMORAL (LEG) ACCESS*  ·      Avoid heavy lifting (over 10 pounds) for 7 days, squatting or excessive bending for 2 days, and strenuous exercise for 7 days.  ·      No submerged bathing, swimming, or hot tubs for the next 7 days, or until fully healed.  ·      Avoid sexual activity for 3-4 days until any groin discomfort has ceased.  Wash the site gently with soap and water. Rinse well and pat dry. Keep the area clean and dry. You may apply a Band-Aid to the site. Avoid lotions, ointments, or powders until fully healed.     - You may shower the day after your procedure.      - It is normal to notice a small bruise around the puncture site and/or a small grape sized or smaller lump. Any large bruising or large lump warrants a call to the office.     - If bleeding should occur, lay down and apply pressure to the affected area for 10 minutes.  If the bleeding stops notify your physician.  If there is a large  amount of bleeding or spurting of blood CALL 911 immediately.  DO NOT drive yourself to the hospital.    - You may experience some tenderness, bruising or minimal inflammation.  If you have any concerns, you may contact the Cath Lab or if any of these symptoms become excessive, contact your cardiologist or go to the emergency room.     OTHER INSTRUCTIONS  - You may take acetaminophen (Tylenol) as directed for discomfort.  If pain is not relieved with acetaminophen (Tylenol), contact your doctor.    - If you notice or experience any of the following, you should notify your doctor or seek medical attention  Chest pain or discomfort  Change in mental status or weakness in extremities.  Dizziness, light headedness, or feeling faint.  Change in the site where the procedure was performed, such as bleeding or an increased area of bruising or swelling.  Tingling, numbness, pain, or coolness in the leg/arm beyond the site where the procedure was performed.  Signs of infection (i.e. shaking chills, temperature > 100 degrees Fahrenheit, warmth, redness) in the leg/arm area where the procedure was performed.  Changes in urination   Bloody or black stools  Vomiting blood  Severe nose bleeds  Any excessive bleeding

## 2024-10-10 NOTE — Clinical Note
TACE performed via right femoral artery, cha beads deployed by Dr. Bartholomew during procedure. See emar for details. Site closed with Mynx closure device, site free from bleeding/hematoma, right pedal pulses 2+ pre and post procedure. Patient's pain well controlled t/o procedure.

## 2024-10-11 NOTE — PROGRESS NOTES
Spoke with patient for follow up call s/p TACE procedure performed 10/10. Pt reports some pain in RUQ and intermittent nausea. He is aware this is to be expected following this procedure and should gradually improve. Pt reports is is able to keep liquids down and is eating ice chips. Pt taking prescribed oxycodone prn for pain. Advised to call IR department or seek emergency care should any significicant change in condition occur. Pt agreeable to above.

## 2024-10-17 ENCOUNTER — APPOINTMENT (OUTPATIENT)
Dept: RADIOLOGY | Facility: HOSPITAL | Age: 59
End: 2024-10-17
Payer: COMMERCIAL

## 2024-11-12 ENCOUNTER — TELEPHONE (OUTPATIENT)
Dept: HEMATOLOGY/ONCOLOGY | Facility: HOSPITAL | Age: 59
End: 2024-11-12
Payer: COMMERCIAL

## 2024-11-14 NOTE — TELEPHONE ENCOUNTER
Spoke with nursing at [facility] regarding patient records.   They will be faxing over a release for records to be sent back.   I will fax all needed information.  No further needs at this time.

## 2025-01-21 ENCOUNTER — APPOINTMENT (OUTPATIENT)
Facility: HOSPITAL | Age: 60
End: 2025-01-21
Payer: COMMERCIAL

## 2025-01-21 ENCOUNTER — APPOINTMENT (OUTPATIENT)
Dept: RADIOLOGY | Facility: HOSPITAL | Age: 60
End: 2025-01-21
Payer: COMMERCIAL

## 2025-01-28 ENCOUNTER — OFFICE VISIT (OUTPATIENT)
Dept: HEMATOLOGY/ONCOLOGY | Facility: HOSPITAL | Age: 60
End: 2025-01-28
Payer: COMMERCIAL

## 2025-01-28 ENCOUNTER — LAB (OUTPATIENT)
Dept: LAB | Facility: HOSPITAL | Age: 60
End: 2025-01-28
Payer: COMMERCIAL

## 2025-01-28 ENCOUNTER — SOCIAL WORK (OUTPATIENT)
Dept: CASE MANAGEMENT | Facility: HOSPITAL | Age: 60
End: 2025-01-28

## 2025-01-28 VITALS
HEART RATE: 71 BPM | BODY MASS INDEX: 32.7 KG/M2 | WEIGHT: 184.6 LBS | OXYGEN SATURATION: 97 % | TEMPERATURE: 98.1 F | SYSTOLIC BLOOD PRESSURE: 168 MMHG | DIASTOLIC BLOOD PRESSURE: 86 MMHG

## 2025-01-28 DIAGNOSIS — C22.0 HEPATOCELLULAR CARCINOMA (MULTI): ICD-10-CM

## 2025-01-28 LAB
AFP SERPL-MCNC: 539 NG/ML (ref 0–9)
ALBUMIN SERPL BCP-MCNC: 4.9 G/DL (ref 3.4–5)
ALP SERPL-CCNC: 74 U/L (ref 33–120)
ALT SERPL W P-5'-P-CCNC: 30 U/L (ref 10–52)
ANION GAP SERPL CALC-SCNC: 14 MMOL/L (ref 10–20)
AST SERPL W P-5'-P-CCNC: 26 U/L (ref 9–39)
BASOPHILS # BLD AUTO: 0.04 X10*3/UL (ref 0–0.1)
BASOPHILS NFR BLD AUTO: 0.5 %
BILIRUB SERPL-MCNC: 0.7 MG/DL (ref 0–1.2)
BUN SERPL-MCNC: 16 MG/DL (ref 6–23)
CALCIUM SERPL-MCNC: 10.5 MG/DL (ref 8.6–10.6)
CHLORIDE SERPL-SCNC: 100 MMOL/L (ref 98–107)
CO2 SERPL-SCNC: 28 MMOL/L (ref 21–32)
CREAT SERPL-MCNC: 0.79 MG/DL (ref 0.5–1.3)
EGFRCR SERPLBLD CKD-EPI 2021: >90 ML/MIN/1.73M*2
EOSINOPHIL # BLD AUTO: 0.18 X10*3/UL (ref 0–0.7)
EOSINOPHIL NFR BLD AUTO: 2.4 %
ERYTHROCYTE [DISTWIDTH] IN BLOOD BY AUTOMATED COUNT: 12.3 % (ref 11.5–14.5)
GLUCOSE SERPL-MCNC: 88 MG/DL (ref 74–99)
HCT VFR BLD AUTO: 50.5 % (ref 41–52)
HGB BLD-MCNC: 18.2 G/DL (ref 13.5–17.5)
IMM GRANULOCYTES # BLD AUTO: 0.02 X10*3/UL (ref 0–0.7)
IMM GRANULOCYTES NFR BLD AUTO: 0.3 % (ref 0–0.9)
INR PPP: 1.1 (ref 0.9–1.1)
LYMPHOCYTES # BLD AUTO: 2.3 X10*3/UL (ref 1.2–4.8)
LYMPHOCYTES NFR BLD AUTO: 30.2 %
MCH RBC QN AUTO: 33.3 PG (ref 26–34)
MCHC RBC AUTO-ENTMCNC: 36 G/DL (ref 32–36)
MCV RBC AUTO: 92 FL (ref 80–100)
MONOCYTES # BLD AUTO: 0.74 X10*3/UL (ref 0.1–1)
MONOCYTES NFR BLD AUTO: 9.7 %
NEUTROPHILS # BLD AUTO: 4.34 X10*3/UL (ref 1.2–7.7)
NEUTROPHILS NFR BLD AUTO: 56.9 %
NRBC BLD-RTO: 0 /100 WBCS (ref 0–0)
PLATELET # BLD AUTO: 157 X10*3/UL (ref 150–450)
POTASSIUM SERPL-SCNC: 4.2 MMOL/L (ref 3.5–5.3)
PROT SERPL-MCNC: 9.2 G/DL (ref 6.4–8.2)
PROTHROMBIN TIME: 12 SECONDS (ref 9.8–12.8)
RBC # BLD AUTO: 5.47 X10*6/UL (ref 4.5–5.9)
SODIUM SERPL-SCNC: 138 MMOL/L (ref 136–145)
WBC # BLD AUTO: 7.6 X10*3/UL (ref 4.4–11.3)

## 2025-01-28 PROCEDURE — 80053 COMPREHEN METABOLIC PANEL: CPT

## 2025-01-28 PROCEDURE — 85025 COMPLETE CBC W/AUTO DIFF WBC: CPT

## 2025-01-28 PROCEDURE — 36415 COLL VENOUS BLD VENIPUNCTURE: CPT

## 2025-01-28 PROCEDURE — 99214 OFFICE O/P EST MOD 30 MIN: CPT | Performed by: INTERNAL MEDICINE

## 2025-01-28 PROCEDURE — 85610 PROTHROMBIN TIME: CPT

## 2025-01-28 PROCEDURE — 82105 ALPHA-FETOPROTEIN SERUM: CPT

## 2025-01-28 ASSESSMENT — ENCOUNTER SYMPTOMS
ENDOCRINE NEGATIVE: 1
RESPIRATORY NEGATIVE: 1
EYES NEGATIVE: 1
HEMATOLOGIC/LYMPHATIC NEGATIVE: 1
FEVER: 0
NAUSEA: 0
CHILLS: 0
ABDOMINAL PAIN: 0
VOMITING: 0
CARDIOVASCULAR NEGATIVE: 1
NERVOUS/ANXIOUS: 1
NEUROLOGICAL NEGATIVE: 1
MUSCULOSKELETAL NEGATIVE: 1

## 2025-01-28 ASSESSMENT — PAIN SCALES - GENERAL: PAINLEVEL_OUTOF10: 0-NO PAIN

## 2025-01-28 NOTE — PROGRESS NOTES
Social Work Note    Referral Source: Dr. Black  Meeting Location: In-Person  Person(s) Present: Pt and the Pt's girlfriend, Mehnaz  Identified Needs: Care Coordination  Impression and Plan: Sw met with the Pt during his F/ U visit with Dr. Black. The Pt explained that he is currently on furlough from being incarcerated due to his 7th LOAN and public intoxication. He was very frustrated while recounting the events that ultimately lead to his arrest. Due to having received 6 prior LOAN charges (dating from [id number]'s) the Pt was sentenced to over one year in long-term plus fines. He is anticipating being released to the community 2/10. He has had his license revoked for 3 years (2027) He will be on probation and require documentation reporting that he has been presenting to his medical appts. Today he was provided with a note indicating this. He plans to have his girlfriend provide transportation for all needs. If she is unable to do so, the Pt has transportation available to him through [facility]. He reported having to cancel previous scans due to his insurance denying coverage. He reported that had he not been incarcerated, he would have been able to discuss with them how to obtain coverage. He has no concerns related to his insurance. It appears active as of 1/28/2025. No other needs identified at this time.   Interventions Provided: Care Coordination, Motivation Interviewing, and Supportive Counseling  Estimated Time Spent: 45 min    SW will continue to follow as needed.       Josefina Finley, MSW, LSW

## 2025-01-28 NOTE — PROGRESS NOTES
Patient ID: Robert Villalta is a 59 y.o. male.  Date of Service: 01/28/25  Referring Physician: Bartolo Black MD  [id number]  [place] [id number]  Primary Care Provider: Dominic Brambila DO      Subjective    Oncology History   Hepatocellular carcinoma (Multi)   8/23/2024 Initial Diagnosis    Hepatocellular carcinoma (Multi)    History of hepatitis C and alcohol use. Underwent ultrasound on 4/11/2024 which showed cirrhosis with questionable lesion.  MR abdomen on 7/24/2024 demonstrated arterial enhancing lesion with washout involving segment 7 of the liver which is partially exophytic measuring 4 cm in greatest dimension consistent with hepatocellular cancer.  AFP was elevated at 505.  Patient case was discussed at tumor board and recommendation was to consider y90 radioembolization     10/10/2024 Procedure    Elke-TACE         HPI    Patient returns for follow-up.  He was incarcerated.  Overall he is feeling well.    Review of Systems   Constitutional:  Negative for chills and fever.   HENT:  Negative.     Eyes: Negative.    Respiratory: Negative.     Cardiovascular: Negative.    Gastrointestinal:  Negative for abdominal pain, nausea and vomiting.   Endocrine: Negative.    Genitourinary: Negative.     Musculoskeletal: Negative.    Skin: Negative.    Neurological: Negative.    Hematological: Negative.    Psychiatric/Behavioral:  The patient is nervous/anxious.    All other systems reviewed and are negative.         Patient Active Problem List   Diagnosis    Hepatocellular carcinoma (Multi)    Chronic hepatitis C without hepatic coma (Multi)     Past Medical History:   Diagnosis Date    Epilepsy     Hepatitis     Hypertension     Liver cancer (Multi)      No past surgical history on file.  No family history on file.    Current Outpatient Medications:     metoprolol tartrate (Lopressor) 50 mg tablet, Take 1 tablet by mouth 2 times a day., Disp: , Rfl:       Objective   BSA: There is no height or weight on file  to calculate BSA.  There were no vitals taken for this visit.    Performance Status:  Symptomatic; fully ambulatory    Physical Exam  Vitals reviewed.   Constitutional:       General: He is not in acute distress.     Appearance: Normal appearance.   HENT:      Head: Normocephalic and atraumatic.   Eyes:      Extraocular Movements: Extraocular movements intact.      Pupils: Pupils are equal, round, and reactive to light.   Cardiovascular:      Rate and Rhythm: Normal rate and regular rhythm.   Pulmonary:      Effort: Pulmonary effort is normal.      Breath sounds: Normal breath sounds.   Abdominal:      General: Bowel sounds are normal.      Tenderness: There is no abdominal tenderness.   Musculoskeletal:         General: Normal range of motion.   Neurological:      General: No focal deficit present.      Mental Status: He is alert and oriented to person, place, and time.   Psychiatric:         Behavior: Behavior normal.       Virtual visit      Assessment/Plan       #1 history of hepatitis C and alcohol use  #2 hepatocellular cancer    Patient is a 59 y.o. male with recently diagnosed hepatocellular cancer.  His case was discussed in the tumor board.  Patient had elevated AFP of 505.  Patient underwent Y90 radioembolization.  She was not considered a great surgical candidate.      we would recommend repeat imaging to evaluate if he has any signs of recurrence. I will order labs as well including NGS testing.  Future plan will be based on the results of scans and lab results.    He will return in approximately 3 to 4 weeks after undergoing restaging scans.    Plan was discussed in detail with the patient and patient was in agreement with the plan of care.           Bartolo Black MD

## 2025-01-28 NOTE — PROGRESS NOTES
Robert is here today for a followup visit. Medications and allergies reviewed with patient. Guardant box provided to patient with completed requisition inside.     SW consult requested to assess needs regarding appointments, transportation, and insurance while incarcerated. ANUJA VELAZQUEZ notified and will see patient in clinic.     Dr. Black has ordered scans and FUV for next month. Message sent to scheduling team.

## 2025-02-12 ENCOUNTER — APPOINTMENT (OUTPATIENT)
Dept: RADIOLOGY | Facility: CLINIC | Age: 60
End: 2025-02-12
Payer: COMMERCIAL

## 2025-02-12 LAB — SCAN RESULT: NORMAL

## 2025-02-18 ENCOUNTER — APPOINTMENT (OUTPATIENT)
Dept: HEMATOLOGY/ONCOLOGY | Facility: HOSPITAL | Age: 60
End: 2025-02-18
Payer: COMMERCIAL

## 2025-02-18 DIAGNOSIS — C22.0 HEPATOCELLULAR CARCINOMA (MULTI): Primary | ICD-10-CM

## 2025-02-19 ENCOUNTER — HOSPITAL ENCOUNTER (OUTPATIENT)
Dept: RADIOLOGY | Facility: CLINIC | Age: 60
Discharge: HOME | End: 2025-02-19
Payer: COMMERCIAL

## 2025-02-19 ENCOUNTER — APPOINTMENT (OUTPATIENT)
Dept: RADIOLOGY | Facility: CLINIC | Age: 60
End: 2025-02-19
Payer: COMMERCIAL

## 2025-02-19 DIAGNOSIS — C22.0 HEPATOCELLULAR CARCINOMA (MULTI): ICD-10-CM

## 2025-02-19 PROCEDURE — A9575 INJ GADOTERATE MEGLUMI 0.1ML: HCPCS | Performed by: INTERNAL MEDICINE

## 2025-02-19 PROCEDURE — 74183 MRI ABD W/O CNTR FLWD CNTR: CPT

## 2025-02-19 PROCEDURE — 2550000001 HC RX 255 CONTRASTS: Performed by: INTERNAL MEDICINE

## 2025-02-19 PROCEDURE — 71250 CT THORAX DX C-: CPT

## 2025-02-19 PROCEDURE — 71250 CT THORAX DX C-: CPT | Performed by: STUDENT IN AN ORGANIZED HEALTH CARE EDUCATION/TRAINING PROGRAM

## 2025-02-19 RX ORDER — GADOTERATE MEGLUMINE 376.9 MG/ML
0.2 INJECTION INTRAVENOUS
Status: COMPLETED | OUTPATIENT
Start: 2025-02-19 | End: 2025-02-19

## 2025-02-19 RX ADMIN — GADOTERATE MEGLUMINE 16.5 ML: 376.9 INJECTION INTRAVENOUS at 10:16

## 2025-02-20 ENCOUNTER — APPOINTMENT (OUTPATIENT)
Dept: RADIOLOGY | Facility: CLINIC | Age: 60
End: 2025-02-20
Payer: COMMERCIAL

## 2025-02-24 ENCOUNTER — TELEMEDICINE (OUTPATIENT)
Dept: HEMATOLOGY/ONCOLOGY | Facility: CLINIC | Age: 60
End: 2025-02-24
Payer: COMMERCIAL

## 2025-02-24 ENCOUNTER — TUMOR BOARD CONFERENCE (OUTPATIENT)
Dept: HEMATOLOGY/ONCOLOGY | Facility: HOSPITAL | Age: 60
End: 2025-02-24
Payer: COMMERCIAL

## 2025-02-24 DIAGNOSIS — C22.0 HEPATOCELLULAR CARCINOMA (MULTI): ICD-10-CM

## 2025-02-24 DIAGNOSIS — C22.0 HEPATOCELLULAR CARCINOMA (MULTI): Primary | ICD-10-CM

## 2025-02-24 ASSESSMENT — ENCOUNTER SYMPTOMS
CHILLS: 0
NERVOUS/ANXIOUS: 1
EYES NEGATIVE: 1
VOMITING: 0
FEVER: 0
MUSCULOSKELETAL NEGATIVE: 1
ABDOMINAL PAIN: 0
HEMATOLOGIC/LYMPHATIC NEGATIVE: 1
NAUSEA: 0
ENDOCRINE NEGATIVE: 1
NEUROLOGICAL NEGATIVE: 1
CARDIOVASCULAR NEGATIVE: 1
RESPIRATORY NEGATIVE: 1

## 2025-02-24 NOTE — PROGRESS NOTES
Virtual or Telephone Consent    While technically available, the patient was unable or unwilling to consent to connect via audio/video telehealth technology; therefore, I performed this visit using a real-time audio only connection between Robert Villalta location: home & [facility] location: [facility].  Verbal consent was requested and obtained from Robert Villalta on this date, 02/24/25 for a telehealth visit.      Patient ID: Robert Villalta is a 59 y.o. male.  Date of Service: 02/24/25  Referring Physician: [facility]  [address] [place] [address]  Primary Care Provider: Dominic Brambila DO      [facility]    Oncology History   Hepatocellular carcinoma (Multi)   8/23/2024 Initial Diagnosis    Hepatocellular carcinoma (Multi)    History of hepatitis C and alcohol use. Underwent ultrasound on 4/11/2024 which showed cirrhosis with questionable lesion.  MR abdomen on 7/24/2024 demonstrated arterial enhancing lesion with washout involving segment 7 of the liver which is partially exophytic measuring 4 cm in greatest dimension consistent with hepatocellular cancer.  AFP was elevated at 505.  Patient case was discussed at tumor board and recommendation was to consider y90 radioembolization     10/10/2024 Procedure    Elke-TACE       HPI  Mr. Villalta recently underwent restaging scans . Overall, he reports feeling well today. Appetite is decent. Denies any abdominal pain, N/V, bowel changes or any new concerns today.     Review of Systems   Constitutional:  Negative for chills and fever.   HENT:  Negative.     Eyes: Negative.    Respiratory: Negative.     Cardiovascular: Negative.    Gastrointestinal:  Negative for abdominal pain, nausea and vomiting.   Endocrine: Negative.    Genitourinary: Negative.     Musculoskeletal: Negative.    Skin: Negative.    Neurological: Negative.    Hematological: Negative.    Psychiatric/Behavioral:  The patient is nervous/anxious.    All other  systems reviewed and are negative.         Patient Active Problem List   Diagnosis    Hepatocellular carcinoma (Multi)    Chronic hepatitis C without hepatic coma (Multi)     Past Medical History:   Diagnosis Date    Epilepsy     Hepatitis     Hypertension     Liver cancer (Multi)      No past surgical history on file.  No family history on file.    Current Outpatient Medications:     metoprolol tartrate (Lopressor) 50 mg tablet, Take 1 tablet by mouth 2 times a day., Disp: , Rfl:       Objective   BSA: There is no height or weight on file to calculate BSA.  There were no vitals taken for this visit.    Performance Status:  Symptomatic; fully ambulatory    Physical Exam  Phone visit today    Assessment/Plan      #1 history of hepatitis C and alcohol use  #2 hepatocellular cancer    Mr. Villalta is a 59 y.o. male with recently diagnosed hepatocellular cancer.  His case was discussed in the tumor board.  Patient had elevated AFP of 505. He underwent Y90 radioembolization. He was not considered a great surgical candidate.     Last visit, we recommended repeating imaging to evaluate if he had any signs of recurrence. We also ordered labs as well including NGS testing.      He had restaging scans on 2/18 which are concerning for possible progression along with elevated tumor marker. I reviewed this with Dr. Black and patient today. At this time, we will recommend the following plan: 1) Will discuss his scans at tumor board, 2) Will follow up 3/4 with Dr. Black to discuss plan of care post tumor board, 3) Advised patient EGD ordered for possible future treatment and asked our nurse to help with scheduling this. 4) Also advised patient if he is consuming any alcohol not to do so. He reassured me today that he have not consumed any alcoholic beverage in a while.     Plan was discussed in detail with the patient and patient was in agreement with the plan of care. RTC 3/4/25 post tumor board.           Sameera Chairez,  APRN-CNP

## 2025-03-04 ENCOUNTER — OFFICE VISIT (OUTPATIENT)
Dept: HEMATOLOGY/ONCOLOGY | Facility: HOSPITAL | Age: 60
End: 2025-03-04
Payer: COMMERCIAL

## 2025-03-04 VITALS
RESPIRATION RATE: 18 BRPM | WEIGHT: 196.4 LBS | OXYGEN SATURATION: 95 % | SYSTOLIC BLOOD PRESSURE: 153 MMHG | HEART RATE: 70 BPM | BODY MASS INDEX: 34.79 KG/M2 | DIASTOLIC BLOOD PRESSURE: 83 MMHG | TEMPERATURE: 97.5 F

## 2025-03-04 DIAGNOSIS — C22.0 HEPATOCELLULAR CARCINOMA (MULTI): Primary | ICD-10-CM

## 2025-03-04 PROCEDURE — 99215 OFFICE O/P EST HI 40 MIN: CPT | Performed by: INTERNAL MEDICINE

## 2025-03-04 RX ORDER — ALBUTEROL SULFATE 0.83 MG/ML
3 SOLUTION RESPIRATORY (INHALATION) AS NEEDED
OUTPATIENT
Start: 2025-03-10

## 2025-03-04 RX ORDER — DIPHENHYDRAMINE HYDROCHLORIDE 50 MG/ML
50 INJECTION INTRAMUSCULAR; INTRAVENOUS AS NEEDED
OUTPATIENT
Start: 2025-03-10

## 2025-03-04 RX ORDER — PROCHLORPERAZINE EDISYLATE 5 MG/ML
10 INJECTION INTRAMUSCULAR; INTRAVENOUS EVERY 6 HOURS PRN
OUTPATIENT
Start: 2025-03-10

## 2025-03-04 RX ORDER — HEPARIN SODIUM,PORCINE/PF 10 UNIT/ML
50 SYRINGE (ML) INTRAVENOUS AS NEEDED
OUTPATIENT
Start: 2025-03-04

## 2025-03-04 RX ORDER — LOPERAMIDE HYDROCHLORIDE 2 MG/1
CAPSULE ORAL
Qty: 100 CAPSULE | Refills: 2 | Status: SHIPPED | OUTPATIENT
Start: 2025-03-04

## 2025-03-04 RX ORDER — HEPARIN 100 UNIT/ML
500 SYRINGE INTRAVENOUS AS NEEDED
OUTPATIENT
Start: 2025-03-04

## 2025-03-04 RX ORDER — PROCHLORPERAZINE MALEATE 10 MG
10 TABLET ORAL EVERY 6 HOURS PRN
Qty: 30 TABLET | Refills: 5 | Status: SHIPPED | OUTPATIENT
Start: 2025-03-04

## 2025-03-04 RX ORDER — PROCHLORPERAZINE MALEATE 10 MG
10 TABLET ORAL EVERY 6 HOURS PRN
OUTPATIENT
Start: 2025-03-10

## 2025-03-04 RX ORDER — FAMOTIDINE 10 MG/ML
20 INJECTION, SOLUTION INTRAVENOUS ONCE AS NEEDED
OUTPATIENT
Start: 2025-03-10

## 2025-03-04 RX ORDER — EPINEPHRINE 0.3 MG/.3ML
0.3 INJECTION SUBCUTANEOUS EVERY 5 MIN PRN
OUTPATIENT
Start: 2025-03-10

## 2025-03-04 ASSESSMENT — ENCOUNTER SYMPTOMS
RESPIRATORY NEGATIVE: 1
NEUROLOGICAL NEGATIVE: 1
NERVOUS/ANXIOUS: 1
NAUSEA: 0
ABDOMINAL PAIN: 0
HEMATOLOGIC/LYMPHATIC NEGATIVE: 1
CARDIOVASCULAR NEGATIVE: 1
EYES NEGATIVE: 1
MUSCULOSKELETAL NEGATIVE: 1
FEVER: 0
VOMITING: 0
CHILLS: 0
ENDOCRINE NEGATIVE: 1

## 2025-03-04 ASSESSMENT — PAIN SCALES - GENERAL: PAINLEVEL_OUTOF10: 0-NO PAIN

## 2025-03-04 NOTE — PROGRESS NOTES
Patient ID: Robert Villalta is a 59 y.o. male.  Date of Service: 03/04/25  Referring Physician: Sameera Chairez, APRN-CNP  [address]  [place] [id number]  Primary Care Provider: Dominic Brambila DO      Subjective    Oncology History   Hepatocellular carcinoma (Multi)   8/23/2024 Initial Diagnosis    Hepatocellular carcinoma (Multi)    History of hepatitis C and alcohol use. Underwent ultrasound on 4/11/2024 which showed cirrhosis with questionable lesion.  MR abdomen on 7/24/2024 demonstrated arterial enhancing lesion with washout involving segment 7 of the liver which is partially exophytic measuring 4 cm in greatest dimension consistent with hepatocellular cancer.  AFP was elevated at 505.  Patient case was discussed at tumor board and recommendation was to consider y90 radioembolization     10/10/2024 Procedure    Elke-TACE     2/19/2025 Imaging    MRI liver with innumerable lesions. Discussed at tumor board with recommendation of systemic therapy     3/11/2025 -  Chemotherapy    Atezolizumab + Bevacizumab, 21 Day Cycles         Patient is here with girlfriend. Overall he feels well and is ready for the next steps in treatment.        Review of Systems   Constitutional:  Negative for chills and fever.   HENT:  Negative.     Eyes: Negative.    Respiratory: Negative.     Cardiovascular: Negative.    Gastrointestinal:  Negative for abdominal pain, nausea and vomiting.   Endocrine: Negative.    Genitourinary: Negative.     Musculoskeletal: Negative.    Skin: Negative.    Neurological: Negative.    Hematological: Negative.    Psychiatric/Behavioral:  The patient is nervous/anxious.    All other systems reviewed and are negative.         Patient Active Problem List   Diagnosis    Hepatocellular carcinoma (Multi)    Chronic hepatitis C without hepatic coma (Multi)     Past Medical History:   Diagnosis Date    Epilepsy     Hepatitis     Hypertension     Liver cancer (Multi)      No past surgical history on  file.  No family history on file.    Current Outpatient Medications:     loperamide (Imodium) 2 mg capsule, Take 2 capsules (4 mg) by mouth with the first episode of diarrhea and 1 capsule (2 mg) by mouth with any additional episodes. Maximum 8 capsules (16 mg) per day., Disp: 100 capsule, Rfl: 2    metoprolol tartrate (Lopressor) 50 mg tablet, Take 1 tablet by mouth 2 times a day., Disp: , Rfl:     prochlorperazine (Compazine) 10 mg tablet, Take 1 tablet (10 mg) by mouth every 6 hours if needed for nausea or vomiting., Disp: 30 tablet, Rfl: 5      Objective   BSA: 1.99 meters squared  /83 (BP Location: Left arm, Patient Position: Sitting, BP Cuff Size: Adult) Comment: first BP was 177/75  Pulse 70   Temp 36.4 °C (97.5 °F) (Temporal)   Resp 18   Wt 89.1 kg (196 lb 6.4 oz) Comment: patient kept shoes on  SpO2 95%   BMI 34.79 kg/m²     Performance Status:  Asymptomatic    Physical Exam  Vitals reviewed.   Constitutional:       General: He is not in acute distress.     Appearance: Normal appearance.   HENT:      Head: Normocephalic and atraumatic.   Eyes:      Extraocular Movements: Extraocular movements intact.      Pupils: Pupils are equal, round, and reactive to light.   Cardiovascular:      Rate and Rhythm: Normal rate and regular rhythm.   Pulmonary:      Effort: Pulmonary effort is normal.      Breath sounds: Examination of the right-lower field reveals rales. Examination of the left-lower field reveals rales. Rales present.   Abdominal:      General: Bowel sounds are normal.      Tenderness: There is no abdominal tenderness.   Musculoskeletal:         General: Normal range of motion.   Neurological:      General: No focal deficit present.      Mental Status: He is alert and oriented to person, place, and time.   Psychiatric:         Behavior: Behavior normal.       Virtual visit      Assessment/Plan       #1 history of hepatitis C and alcohol use  #2 hepatocellular cancer    Patient is a 59 y.o. male  with recently diagnosed hepatocellular cancer.  His case was discussed in the tumor board.  Patient had elevated AFP of 505.  Patient underwent Y90 radioembolization.  He was not considered a great surgical candidate. MRI liver performed 2/19/25 suggests recurrence of disease with several lesions. No actionable mutations in [id number] liquid biopsy from 1/29/25. Tumor board discussion favors systemic therapy (of either atezolizumab/bevacizumab or durvalumab/tremelimumab).    We will plan to start systemic therapy with atezolizumab/bevacizumab. Risks and benefits of therapy were discussed with the patient. We counseled the patient to ensure his HTN is controlled (systolic BP has been as high as 150s recently). EGD has been ordered and needs to be scheduled.    Plan was discussed in detail with the patient and patient was in agreement with the plan of care.    Iftikhar Kuo MD  Medical Oncology Fellow  3/4/2025             Bartolo Black MD

## 2025-03-04 NOTE — PROGRESS NOTES
Robert is here today for a followup visit. Black binder, red chemo bag, clinic contact information, and atezo/damien information packet provided and reviewed with patient. Reviewed process for labs prior to FUV/infusion. Patient prefers to do labs and infusions at [facility] whenever possible. Message sent to [facility] charge team requesting chair for first dose ordered for 3/11. Patient escorted to scheduling and instructed to go to lab afterward; he expressed understanding.     Received chair at [facility] from Alicja Sauceda RN: 3/10, room 19 at 0900, 4 hrs. Message sent to urgent scheduling team.

## 2025-03-10 ENCOUNTER — INFUSION (OUTPATIENT)
Dept: HEMATOLOGY/ONCOLOGY | Facility: CLINIC | Age: 60
End: 2025-03-10
Payer: COMMERCIAL

## 2025-03-10 VITALS
HEART RATE: 71 BPM | SYSTOLIC BLOOD PRESSURE: 126 MMHG | OXYGEN SATURATION: 93 % | BODY MASS INDEX: 34.92 KG/M2 | TEMPERATURE: 98.6 F | WEIGHT: 197.09 LBS | RESPIRATION RATE: 16 BRPM | HEIGHT: 63 IN | DIASTOLIC BLOOD PRESSURE: 88 MMHG

## 2025-03-10 DIAGNOSIS — C22.0 HEPATOCELLULAR CARCINOMA (MULTI): ICD-10-CM

## 2025-03-10 LAB
ALBUMIN SERPL BCP-MCNC: 4.3 G/DL (ref 3.4–5)
ALP SERPL-CCNC: 68 U/L (ref 33–120)
ALT SERPL W P-5'-P-CCNC: 21 U/L (ref 10–52)
ANION GAP SERPL CALC-SCNC: 13 MMOL/L (ref 10–20)
APPEARANCE UR: CLEAR
AST SERPL W P-5'-P-CCNC: 23 U/L (ref 9–39)
BASOPHILS # BLD AUTO: 0.02 X10*3/UL (ref 0–0.1)
BASOPHILS NFR BLD AUTO: 0.4 %
BILIRUB SERPL-MCNC: 0.8 MG/DL (ref 0–1.2)
BILIRUB UR STRIP.AUTO-MCNC: NEGATIVE MG/DL
BUN SERPL-MCNC: 17 MG/DL (ref 6–23)
CALCIUM SERPL-MCNC: 9.5 MG/DL (ref 8.6–10.3)
CHLORIDE SERPL-SCNC: 101 MMOL/L (ref 98–107)
CO2 SERPL-SCNC: 26 MMOL/L (ref 21–32)
COLOR UR: NORMAL
CREAT SERPL-MCNC: 0.75 MG/DL (ref 0.5–1.3)
EGFRCR SERPLBLD CKD-EPI 2021: >90 ML/MIN/1.73M*2
EOSINOPHIL # BLD AUTO: 0.18 X10*3/UL (ref 0–0.7)
EOSINOPHIL NFR BLD AUTO: 3.3 %
ERYTHROCYTE [DISTWIDTH] IN BLOOD BY AUTOMATED COUNT: 13.1 % (ref 11.5–14.5)
GLUCOSE SERPL-MCNC: 104 MG/DL (ref 74–99)
GLUCOSE UR STRIP.AUTO-MCNC: NORMAL MG/DL
HCT VFR BLD AUTO: 45.1 % (ref 41–52)
HGB BLD-MCNC: 16 G/DL (ref 13.5–17.5)
HOLD SPECIMEN: NORMAL
IMM GRANULOCYTES # BLD AUTO: 0.01 X10*3/UL (ref 0–0.7)
IMM GRANULOCYTES NFR BLD AUTO: 0.2 % (ref 0–0.9)
KETONES UR STRIP.AUTO-MCNC: NEGATIVE MG/DL
LEUKOCYTE ESTERASE UR QL STRIP.AUTO: NEGATIVE
LYMPHOCYTES # BLD AUTO: 1.67 X10*3/UL (ref 1.2–4.8)
LYMPHOCYTES NFR BLD AUTO: 30.9 %
MCH RBC QN AUTO: 33.4 PG (ref 26–34)
MCHC RBC AUTO-ENTMCNC: 35.5 G/DL (ref 32–36)
MCV RBC AUTO: 94 FL (ref 80–100)
MONOCYTES # BLD AUTO: 0.55 X10*3/UL (ref 0.1–1)
MONOCYTES NFR BLD AUTO: 10.2 %
NEUTROPHILS # BLD AUTO: 2.97 X10*3/UL (ref 1.2–7.7)
NEUTROPHILS NFR BLD AUTO: 55 %
NITRITE UR QL STRIP.AUTO: NEGATIVE
PH UR STRIP.AUTO: 6 [PH]
PLATELET # BLD AUTO: 139 X10*3/UL (ref 150–450)
POTASSIUM SERPL-SCNC: 3.9 MMOL/L (ref 3.5–5.3)
PROT SERPL-MCNC: 7.9 G/DL (ref 6.4–8.2)
PROT UR STRIP.AUTO-MCNC: NEGATIVE MG/DL
RBC # BLD AUTO: 4.79 X10*6/UL (ref 4.5–5.9)
RBC # UR STRIP.AUTO: NEGATIVE MG/DL
SODIUM SERPL-SCNC: 136 MMOL/L (ref 136–145)
SP GR UR STRIP.AUTO: 1.02
T4 FREE SERPL-MCNC: 0.74 NG/DL (ref 0.61–1.12)
TSH SERPL-ACNC: 5.14 MIU/L (ref 0.44–3.98)
UROBILINOGEN UR STRIP.AUTO-MCNC: NORMAL MG/DL
WBC # BLD AUTO: 5.4 X10*3/UL (ref 4.4–11.3)

## 2025-03-10 PROCEDURE — 82533 TOTAL CORTISOL: CPT

## 2025-03-10 PROCEDURE — 36415 COLL VENOUS BLD VENIPUNCTURE: CPT

## 2025-03-10 PROCEDURE — 82105 ALPHA-FETOPROTEIN SERUM: CPT

## 2025-03-10 PROCEDURE — 96413 CHEMO IV INFUSION 1 HR: CPT

## 2025-03-10 PROCEDURE — 84439 ASSAY OF FREE THYROXINE: CPT

## 2025-03-10 PROCEDURE — 84075 ASSAY ALKALINE PHOSPHATASE: CPT

## 2025-03-10 PROCEDURE — 81003 URINALYSIS AUTO W/O SCOPE: CPT

## 2025-03-10 PROCEDURE — 86704 HEP B CORE ANTIBODY TOTAL: CPT

## 2025-03-10 PROCEDURE — 96417 CHEMO IV INFUS EACH ADDL SEQ: CPT

## 2025-03-10 PROCEDURE — 85025 COMPLETE CBC W/AUTO DIFF WBC: CPT

## 2025-03-10 PROCEDURE — 84443 ASSAY THYROID STIM HORMONE: CPT

## 2025-03-10 PROCEDURE — 2500000004 HC RX 250 GENERAL PHARMACY W/ HCPCS (ALT 636 FOR OP/ED): Mod: SE | Performed by: INTERNAL MEDICINE

## 2025-03-10 PROCEDURE — 87340 HEPATITIS B SURFACE AG IA: CPT

## 2025-03-10 PROCEDURE — 82024 ASSAY OF ACTH: CPT

## 2025-03-10 PROCEDURE — 86706 HEP B SURFACE ANTIBODY: CPT

## 2025-03-10 RX ORDER — DIPHENHYDRAMINE HYDROCHLORIDE 50 MG/ML
50 INJECTION INTRAMUSCULAR; INTRAVENOUS AS NEEDED
Status: DISCONTINUED | OUTPATIENT
Start: 2025-03-10 | End: 2025-03-10 | Stop reason: HOSPADM

## 2025-03-10 RX ORDER — PROCHLORPERAZINE EDISYLATE 5 MG/ML
10 INJECTION INTRAMUSCULAR; INTRAVENOUS EVERY 6 HOURS PRN
Status: DISCONTINUED | OUTPATIENT
Start: 2025-03-10 | End: 2025-03-10 | Stop reason: HOSPADM

## 2025-03-10 RX ORDER — EPINEPHRINE 0.3 MG/.3ML
0.3 INJECTION SUBCUTANEOUS EVERY 5 MIN PRN
Status: DISCONTINUED | OUTPATIENT
Start: 2025-03-10 | End: 2025-03-10 | Stop reason: HOSPADM

## 2025-03-10 RX ORDER — PROCHLORPERAZINE MALEATE 10 MG
10 TABLET ORAL EVERY 6 HOURS PRN
Status: DISCONTINUED | OUTPATIENT
Start: 2025-03-10 | End: 2025-03-10 | Stop reason: HOSPADM

## 2025-03-10 RX ORDER — FAMOTIDINE 10 MG/ML
20 INJECTION, SOLUTION INTRAVENOUS ONCE AS NEEDED
Status: DISCONTINUED | OUTPATIENT
Start: 2025-03-10 | End: 2025-03-10 | Stop reason: HOSPADM

## 2025-03-10 RX ORDER — ALBUTEROL SULFATE 0.83 MG/ML
3 SOLUTION RESPIRATORY (INHALATION) AS NEEDED
Status: DISCONTINUED | OUTPATIENT
Start: 2025-03-10 | End: 2025-03-10 | Stop reason: HOSPADM

## 2025-03-10 RX ADMIN — BEVACIZUMAB-AWWB 1300 MG: 400 INJECTION, SOLUTION INTRAVENOUS at 12:39

## 2025-03-10 RX ADMIN — ATEZOLIZUMAB 1200 MG: 1200 INJECTION, SOLUTION INTRAVENOUS at 11:26

## 2025-03-10 ASSESSMENT — PAIN SCALES - GENERAL: PAINLEVEL_OUTOF10: 5

## 2025-03-11 LAB
AFP SERPL-MCNC: 633 NG/ML (ref 0–9)
CORTIS AM PEAK SERPL-MSCNC: 8.7 UG/DL (ref 5–20)
HBV CORE AB SER QL: NONREACTIVE
HBV SURFACE AB SER-ACNC: 4 MIU/ML
HBV SURFACE AG SERPL QL IA: NONREACTIVE

## 2025-03-12 LAB — ACTH PLAS-MCNC: 16.8 PG/ML (ref 7.2–63.3)

## 2025-03-17 ENCOUNTER — TELEPHONE (OUTPATIENT)
Dept: HEMATOLOGY/ONCOLOGY | Facility: HOSPITAL | Age: 60
End: 2025-03-17
Payer: COMMERCIAL

## 2025-03-17 NOTE — TELEPHONE ENCOUNTER
Patient scheduled for treatment at [facility] and they are just awaiting our removal of auth so theirs will go through. Infusion apts here cancelled.

## 2025-03-17 NOTE — TELEPHONE ENCOUNTER
Please confirm [facility] as [facility] can not signs orders there. We are happy to see PRN to check in and respect if patient wishes to be treated closer to home.

## 2025-03-17 NOTE — TELEPHONE ENCOUNTER
Shahida from [facility] calling to request our auth for treatment be removed so that theirs will go through as patient will be receiving treatment there. Called patient to confirm, states he plans to still come see Dr Black and team just wants to receive treatment locally to save time and gas.

## 2025-04-01 ENCOUNTER — APPOINTMENT (OUTPATIENT)
Dept: HEMATOLOGY/ONCOLOGY | Facility: CLINIC | Age: 60
End: 2025-04-01
Payer: COMMERCIAL

## 2025-04-01 ENCOUNTER — TELEMEDICINE (OUTPATIENT)
Dept: HEMATOLOGY/ONCOLOGY | Facility: HOSPITAL | Age: 60
End: 2025-04-01
Payer: COMMERCIAL

## 2025-04-01 DIAGNOSIS — C22.0 HEPATOCELLULAR CARCINOMA: ICD-10-CM

## 2025-04-01 PROCEDURE — 99214 OFFICE O/P EST MOD 30 MIN: CPT

## 2025-04-01 ASSESSMENT — ENCOUNTER SYMPTOMS
MUSCULOSKELETAL NEGATIVE: 1
ENDOCRINE NEGATIVE: 1
CARDIOVASCULAR NEGATIVE: 1
CHILLS: 0
VOMITING: 0
FEVER: 0
NEUROLOGICAL NEGATIVE: 1
HEMATOLOGIC/LYMPHATIC NEGATIVE: 1
ABDOMINAL PAIN: 0
RESPIRATORY NEGATIVE: 1
NAUSEA: 0
FATIGUE: 1
EYES NEGATIVE: 1

## 2025-04-02 ENCOUNTER — APPOINTMENT (OUTPATIENT)
Dept: HEMATOLOGY/ONCOLOGY | Facility: CLINIC | Age: 60
End: 2025-04-02
Payer: COMMERCIAL

## 2025-04-02 NOTE — PROGRESS NOTES
Virtual or Telephone Consent    While technically available, the patient was unable or unwilling to consent to connect via audio/video telehealth technology; therefore, I performed this visit using a real-time audio only connection between Robert Villalta location: home & CANDACE Jackson location: [facility].  Verbal consent was requested and obtained from Robert Villalta on this date, 04/01/25 for a telehealth visit.      Patient ID: Robert Villalta is a 59 y.o. male.  Date of Service: 04/01/25  Referring Physician: Bartolo Black MD  [id number] [place] [address]  Primary Care Provider: Dominic Brambila DO      Subjective    Oncology History   Hepatocellular carcinoma   8/23/2024 Initial Diagnosis    Hepatocellular carcinoma (Multi)    History of hepatitis C and alcohol use. Underwent ultrasound on 4/11/2024 which showed cirrhosis with questionable lesion.  MR abdomen on 7/24/2024 demonstrated arterial enhancing lesion with washout involving segment 7 of the liver which is partially exophytic measuring 4 cm in greatest dimension consistent with hepatocellular cancer.  AFP was elevated at 505.  Patient case was discussed at tumor board and recommendation was to consider y90 radioembolization     10/10/2024 Procedure    [facility]     2/19/2025 Imaging    MRI liver with innumerable lesions. Discussed at tumor board with recommendation of systemic therapy     3/10/2025 -  Chemotherapy    Atezolizumab + Bevacizumab, 21 Day Cycles       HPI  Mr. Villalta recently started treatment with Atezolizumab and Bevacizumab. Overall, he reports feeling well today. Appetite is decent. Denies any abdominal pain, N/V, bowel changes or any new concerns today. Has very mild fatigue, but states that it is manageable.      Review of Systems   Constitutional:  Positive for fatigue. Negative for chills and fever.   HENT:  Negative.     Eyes: Negative.    Respiratory: Negative.     Cardiovascular: Negative.     Gastrointestinal:  Negative for abdominal pain, nausea and vomiting.   Endocrine: Negative.    Genitourinary: Negative.     Musculoskeletal: Negative.    Skin: Negative.    Neurological: Negative.    Hematological: Negative.    All other systems reviewed and are negative.         Patient Active Problem List   Diagnosis    Hepatocellular carcinoma    Chronic hepatitis C without hepatic coma (Multi)     Past Medical History:   Diagnosis Date    Epilepsy     Hepatitis     Hypertension     Liver cancer (Multi)      No past surgical history on file.  No family history on file.    Current Outpatient Medications:     loperamide (Imodium) 2 mg capsule, Take 2 capsules (4 mg) by mouth with the first episode of diarrhea and 1 capsule (2 mg) by mouth with any additional episodes. Maximum 8 capsules (16 mg) per day., Disp: 100 capsule, Rfl: 2    metoprolol tartrate (Lopressor) 50 mg tablet, Take 1 tablet by mouth 2 times a day., Disp: , Rfl:     prochlorperazine (Compazine) 10 mg tablet, Take 1 tablet (10 mg) by mouth every 6 hours if needed for nausea or vomiting., Disp: 30 tablet, Rfl: 5      Objective   BSA: There is no height or weight on file to calculate BSA.  There were no vitals taken for this visit.    Performance Status:  Symptomatic; fully ambulatory    Physical Exam  Phone visit today    Assessment/Plan      #1 history of hepatitis C and alcohol use  #2 hepatocellular cancer    Mr. Villalta is a 59 y.o. male with recently diagnosed hepatocellular cancer.  His case was discussed in the tumor board.  Patient had elevated AFP of 505. He underwent Y90 radioembolization. He was not considered a great surgical candidate.     In February, we recommended repeating imaging to evaluate if he had any signs of recurrence. We also ordered labs as well including NGS testing.      He had restaging scans on 2/18 which were concerning for possible progression along with elevated tumor marker. His scans were reviewed at tumor board  and it was agreed that progression was present. The plan was to start treatment with Atezolizumab + Bevacizumab.     Due to distance from home, he did establish care at an OSH facility. Oncologist at facility did initiate him on same treatment plan. He is tolerating treatment well and plans to continue to follow at OSH facility.     Mr. Villalta will follow on as needed basis as he has oncology follow up at outside facility now. He verbalized understanding. He is aware that he can call with any issues or concerns. No further needs at this time.               Sameera Chairez, APRN-CNP

## 2025-04-22 ENCOUNTER — APPOINTMENT (OUTPATIENT)
Dept: HEMATOLOGY/ONCOLOGY | Facility: HOSPITAL | Age: 60
End: 2025-04-22
Payer: COMMERCIAL

## 2025-04-22 ENCOUNTER — APPOINTMENT (OUTPATIENT)
Dept: HEMATOLOGY/ONCOLOGY | Facility: CLINIC | Age: 60
End: 2025-04-22
Payer: COMMERCIAL